# Patient Record
Sex: FEMALE | Race: WHITE | Employment: FULL TIME
[De-identification: names, ages, dates, MRNs, and addresses within clinical notes are randomized per-mention and may not be internally consistent; named-entity substitution may affect disease eponyms.]

---

## 2024-02-02 ENCOUNTER — OFFICE VISIT (OUTPATIENT)
Dept: FAMILY MEDICINE CLINIC | Facility: CLINIC | Age: 24
End: 2024-02-02

## 2024-02-02 VITALS
TEMPERATURE: 97.9 F | HEIGHT: 64 IN | BODY MASS INDEX: 19.81 KG/M2 | SYSTOLIC BLOOD PRESSURE: 102 MMHG | OXYGEN SATURATION: 99 % | WEIGHT: 116 LBS | DIASTOLIC BLOOD PRESSURE: 60 MMHG | HEART RATE: 74 BPM

## 2024-02-02 DIAGNOSIS — M67.432 GANGLION CYST OF DORSUM OF LEFT WRIST: Primary | ICD-10-CM

## 2024-02-02 SDOH — ECONOMIC STABILITY: FOOD INSECURITY: WITHIN THE PAST 12 MONTHS, THE FOOD YOU BOUGHT JUST DIDN'T LAST AND YOU DIDN'T HAVE MONEY TO GET MORE.: NEVER TRUE

## 2024-02-02 SDOH — ECONOMIC STABILITY: FOOD INSECURITY: WITHIN THE PAST 12 MONTHS, YOU WORRIED THAT YOUR FOOD WOULD RUN OUT BEFORE YOU GOT MONEY TO BUY MORE.: NEVER TRUE

## 2024-02-02 SDOH — ECONOMIC STABILITY: INCOME INSECURITY: HOW HARD IS IT FOR YOU TO PAY FOR THE VERY BASICS LIKE FOOD, HOUSING, MEDICAL CARE, AND HEATING?: NOT HARD AT ALL

## 2024-02-02 SDOH — ECONOMIC STABILITY: HOUSING INSECURITY
IN THE LAST 12 MONTHS, WAS THERE A TIME WHEN YOU DID NOT HAVE A STEADY PLACE TO SLEEP OR SLEPT IN A SHELTER (INCLUDING NOW)?: NO

## 2024-02-02 ASSESSMENT — ENCOUNTER SYMPTOMS
CHEST TIGHTNESS: 0
ABDOMINAL PAIN: 0
EYE DISCHARGE: 0
SINUS PAIN: 0
DIARRHEA: 0
SHORTNESS OF BREATH: 0
COUGH: 0
CONSTIPATION: 0

## 2024-02-02 ASSESSMENT — PATIENT HEALTH QUESTIONNAIRE - PHQ9
SUM OF ALL RESPONSES TO PHQ QUESTIONS 1-9: 1
1. LITTLE INTEREST OR PLEASURE IN DOING THINGS: 0
SUM OF ALL RESPONSES TO PHQ9 QUESTIONS 1 & 2: 1
SUM OF ALL RESPONSES TO PHQ QUESTIONS 1-9: 1

## 2024-02-02 NOTE — PROGRESS NOTES
Mary Howe is a 23 y.o. female who presents with   Chief Complaint   Patient presents with    Cyst     On left wrist, worsening - larger and more painful    Skin Problem     Right ring finger, raised area, no pain, sometimes peels       History of Present Illness  Ganglion cyst on dorsum L wrist.  Increasing in size and mildly uncomfortable.  Mood doing well.     Review of Systems  Review of Systems   Constitutional:  Negative for appetite change, fatigue and fever.   HENT:  Negative for congestion, ear pain and sinus pain.    Eyes:  Negative for discharge.   Respiratory:  Negative for cough, chest tightness and shortness of breath.    Cardiovascular:  Negative for chest pain, palpitations and leg swelling.   Gastrointestinal:  Negative for abdominal pain, constipation and diarrhea.   Genitourinary:  Negative for dysuria.   Musculoskeletal:  Negative for joint swelling.   Skin:  Negative for rash.   Neurological:  Negative for headaches.   Hematological:  Negative for adenopathy.   Psychiatric/Behavioral:  Negative for dysphoric mood. The patient is not nervous/anxious.         Medications  Current Outpatient Medications   Medication Sig Dispense Refill    levonorgestrel-ethinyl estradiol (AVIANE;ALESSE;LESSINA) 0.1-20 MG-MCG per tablet Take 1 tablet by mouth daily 3 packet 3    sertraline (ZOLOFT) 25 MG tablet Take 1 tablet by mouth daily 90 tablet 3    ondansetron (ZOFRAN-ODT) 4 MG disintegrating tablet Take 1 tablet by mouth every 8 hours as needed       No current facility-administered medications for this visit.        Past Medical History  Past Medical History:   Diagnosis Date    Anxiety     Kidney stone 11/2015       Surgical History  No past surgical history on file.       Family History  Family History   Problem Relation Age of Onset    Cancer Mother     No Known Problems Father     No Known Problems Sister        Social History  Social History     Socioeconomic History    Marital status:

## 2024-02-12 ENCOUNTER — APPOINTMENT (RX ONLY)
Dept: URBAN - METROPOLITAN AREA CLINIC 330 | Facility: CLINIC | Age: 24
Setting detail: DERMATOLOGY
End: 2024-02-12

## 2024-02-12 DIAGNOSIS — M67.4 GANGLION: ICD-10-CM

## 2024-02-12 DIAGNOSIS — Z80.8 FAMILY HISTORY OF MALIGNANT NEOPLASM OF OTHER ORGANS OR SYSTEMS: ICD-10-CM

## 2024-02-12 DIAGNOSIS — L57.8 OTHER SKIN CHANGES DUE TO CHRONIC EXPOSURE TO NONIONIZING RADIATION: ICD-10-CM

## 2024-02-12 DIAGNOSIS — Z71.89 OTHER SPECIFIED COUNSELING: ICD-10-CM

## 2024-02-12 DIAGNOSIS — D22 MELANOCYTIC NEVI: ICD-10-CM

## 2024-02-12 PROBLEM — D48.5 NEOPLASM OF UNCERTAIN BEHAVIOR OF SKIN: Status: ACTIVE | Noted: 2024-02-12

## 2024-02-12 PROBLEM — D22.5 MELANOCYTIC NEVI OF TRUNK: Status: ACTIVE | Noted: 2024-02-12

## 2024-02-12 PROCEDURE — 99203 OFFICE O/P NEW LOW 30 MIN: CPT

## 2024-02-12 PROCEDURE — ? OTHER

## 2024-02-12 PROCEDURE — ? BODY PHOTOGRAPHY

## 2024-02-12 PROCEDURE — ? TREATMENT REGIMEN

## 2024-02-12 PROCEDURE — ? EDUCATIONAL RESOURCES PROVIDED

## 2024-02-12 PROCEDURE — ? FULL BODY SKIN EXAM

## 2024-02-12 PROCEDURE — ? COUNSELING

## 2024-02-12 ASSESSMENT — LOCATION SIMPLE DESCRIPTION DERM
LOCATION SIMPLE: LEFT HAND
LOCATION SIMPLE: LEFT UPPER BACK
LOCATION SIMPLE: RIGHT LOWER BACK
LOCATION SIMPLE: RIGHT UPPER BACK
LOCATION SIMPLE: UPPER BACK

## 2024-02-12 ASSESSMENT — LOCATION DETAILED DESCRIPTION DERM
LOCATION DETAILED: SUPERIOR THORACIC SPINE
LOCATION DETAILED: LEFT MEDIAL UPPER BACK
LOCATION DETAILED: RIGHT SUPERIOR MEDIAL MIDBACK
LOCATION DETAILED: RIGHT MID-UPPER BACK
LOCATION DETAILED: INFERIOR THORACIC SPINE
LOCATION DETAILED: LEFT RADIAL DORSAL HAND

## 2024-02-12 ASSESSMENT — LOCATION ZONE DERM
LOCATION ZONE: TRUNK
LOCATION ZONE: HAND

## 2024-02-12 NOTE — PROCEDURE: OTHER
Detail Level: Generalized
Render Risk Assessment In Note?: yes
Note Text (......Xxx Chief Complaint.): This diagnosis correlates with the
Other (Free Text): Patient consent was obtained to proceed with the visit and recommended plan of care after discussion of all risks and benefits, including the risks of COVID-19 exposure.
Other (Free Text): Pt states PCP drained the lesion. \\nLesion is typically more noticeable. \\nPt states this is painful.\\nWill refer patient to hand center.
Detail Level: Simple
Other (Free Text): Mother

## 2024-02-12 NOTE — PROCEDURE: BODY PHOTOGRAPHY
Was The Entire Body Photographed (Cannot Bill Unless Entire Body Photographed)?: Please Select Yes or No - If you select Yes you are confirming that you took full body photographs
Reason For Photography: The patient is obtaining whole body photography to observe existing suspicious moles and or monitor for the appearance of any new lesions.
Whole Body Statement: The whole body was photographed today.
Consent was obtained for whole body photography. Patient understands that photograph costs may not be covered by insurance.
Number Of Photographs (Optional- Will Not Render If 0): 10
Detail Level: Detailed

## 2024-05-22 ENCOUNTER — OFFICE VISIT (OUTPATIENT)
Dept: FAMILY MEDICINE CLINIC | Facility: CLINIC | Age: 24
End: 2024-05-22
Payer: COMMERCIAL

## 2024-05-22 VITALS
BODY MASS INDEX: 21.97 KG/M2 | OXYGEN SATURATION: 98 % | SYSTOLIC BLOOD PRESSURE: 116 MMHG | TEMPERATURE: 97.5 F | DIASTOLIC BLOOD PRESSURE: 68 MMHG | HEART RATE: 78 BPM | WEIGHT: 128 LBS

## 2024-05-22 DIAGNOSIS — N92.6 IRREGULAR MENSTRUATION, UNSPECIFIED: ICD-10-CM

## 2024-05-22 DIAGNOSIS — F41.9 ANXIETY: ICD-10-CM

## 2024-05-22 PROCEDURE — 99213 OFFICE O/P EST LOW 20 MIN: CPT | Performed by: FAMILY MEDICINE

## 2024-05-22 RX ORDER — LEVONORGESTREL/ETHIN.ESTRADIOL 0.1-0.02MG
1 TABLET ORAL DAILY
Qty: 3 PACKET | Refills: 3 | Status: SHIPPED | OUTPATIENT
Start: 2024-05-22

## 2024-05-22 RX ORDER — SERTRALINE HYDROCHLORIDE 25 MG/1
25 TABLET, FILM COATED ORAL DAILY
Qty: 90 TABLET | Refills: 3 | Status: SHIPPED | OUTPATIENT
Start: 2024-05-22

## 2024-05-22 ASSESSMENT — ENCOUNTER SYMPTOMS
SINUS PAIN: 0
CONSTIPATION: 0
DIARRHEA: 0
EYE DISCHARGE: 0
COUGH: 0
SHORTNESS OF BREATH: 0
CHEST TIGHTNESS: 0
ABDOMINAL PAIN: 0

## 2024-05-22 NOTE — PROGRESS NOTES
Mary Howe is a 24 y.o. female who presents with   Chief Complaint   Patient presents with    Anxiety       History of Present Illness  Pt taking Zoloft for anxiety.  Mood has been good and anxiety sxs controlled.  Sleeping well.  Appetite good.  Periods doing well on OCP.  Does not plan pregnancy soon.  Pap 5/2023. Ganglion cyst healing well since surgery.     Review of Systems  Review of Systems   Constitutional:  Negative for appetite change, fatigue and fever.   HENT:  Negative for congestion, ear pain and sinus pain.    Eyes:  Negative for discharge.   Respiratory:  Negative for cough, chest tightness and shortness of breath.    Cardiovascular:  Negative for chest pain, palpitations and leg swelling.   Gastrointestinal:  Negative for abdominal pain, constipation and diarrhea.   Genitourinary:  Negative for dysuria.   Musculoskeletal:  Negative for joint swelling.   Skin:  Negative for rash.   Neurological:  Negative for headaches.   Hematological:  Negative for adenopathy.   Psychiatric/Behavioral:  Negative for dysphoric mood. The patient is not nervous/anxious.         Medications  Current Outpatient Medications   Medication Sig Dispense Refill    levonorgestrel-ethinyl estradiol (AVIANE;ALESSE;LESSINA) 0.1-20 MG-MCG per tablet Take 1 tablet by mouth daily 3 packet 3    sertraline (ZOLOFT) 25 MG tablet Take 1 tablet by mouth daily 90 tablet 3    ondansetron (ZOFRAN-ODT) 4 MG disintegrating tablet Take 1 tablet by mouth every 8 hours as needed       No current facility-administered medications for this visit.        Past Medical History  Past Medical History:   Diagnosis Date    Anxiety     Kidney stone 11/2015       Surgical History  Past Surgical History:   Procedure Laterality Date    GANGLION CYST EXCISION Left 04/03/2024    wrist          Family History  Family History   Problem Relation Age of Onset    Cancer Mother     No Known Problems Father     No Known Problems Sister        Social

## 2024-08-16 ENCOUNTER — OFFICE VISIT (OUTPATIENT)
Dept: FAMILY MEDICINE CLINIC | Facility: CLINIC | Age: 24
End: 2024-08-16
Payer: COMMERCIAL

## 2024-08-16 VITALS
WEIGHT: 131 LBS | BODY MASS INDEX: 22.36 KG/M2 | SYSTOLIC BLOOD PRESSURE: 104 MMHG | OXYGEN SATURATION: 99 % | DIASTOLIC BLOOD PRESSURE: 76 MMHG | HEART RATE: 106 BPM | TEMPERATURE: 97.9 F | HEIGHT: 64 IN

## 2024-08-16 DIAGNOSIS — J40 BRONCHITIS: Primary | ICD-10-CM

## 2024-08-16 PROCEDURE — 99213 OFFICE O/P EST LOW 20 MIN: CPT | Performed by: NURSE PRACTITIONER

## 2024-08-16 RX ORDER — CODEINE PHOSPHATE/GUAIFENESIN 10-100MG/5
5 LIQUID (ML) ORAL 2 TIMES DAILY PRN
Qty: 120 ML | Refills: 0 | Status: SHIPPED | OUTPATIENT
Start: 2024-08-16 | End: 2024-08-28

## 2024-08-16 RX ORDER — CEFDINIR 300 MG/1
300 CAPSULE ORAL 2 TIMES DAILY
Qty: 20 CAPSULE | Refills: 0 | Status: SHIPPED | OUTPATIENT
Start: 2024-08-16 | End: 2024-08-26

## 2024-08-16 RX ORDER — ALBUTEROL SULFATE 90 UG/1
2 AEROSOL, METERED RESPIRATORY (INHALATION) 4 TIMES DAILY PRN
Qty: 18 G | Refills: 0 | Status: SHIPPED | OUTPATIENT
Start: 2024-08-16

## 2024-08-16 RX ORDER — METHYLPREDNISOLONE 4 MG/1
TABLET ORAL
Qty: 1 KIT | Refills: 0 | Status: SHIPPED | OUTPATIENT
Start: 2024-08-16 | End: 2024-08-22

## 2024-08-16 ASSESSMENT — ENCOUNTER SYMPTOMS
EYE REDNESS: 0
SORE THROAT: 0
CONSTIPATION: 0
NAUSEA: 0
SINUS PAIN: 0
COUGH: 1
EYE PAIN: 0
VOMITING: 0
SHORTNESS OF BREATH: 0
BACK PAIN: 0
DIARRHEA: 0

## 2024-08-16 NOTE — PROGRESS NOTES
Mary Howe (:  2000) is a 24 y.o. female,Established patient, here for evaluation of the following chief complaint(s):  Cough (Was dx with bronchtitis  at urgent , SOB , bad cough )         Assessment & Plan  Bronchitis   Uncontrolled, changes made today: start Omnicef, medrol dose pack and PRN codeine cough syrup/ventolin HFA as directed.  Discussed medications, side effects and risks versus benefits in detail.  Increase fluids and rest.  Flonase and an allergy pill encouraged for symptom management.  Send My Chart message update Monday.  If not improving, will need CXR.   Precautions given for over the weekend and she voiced understanding.    Orders:    cefdinir (OMNICEF) 300 MG capsule; Take 1 capsule by mouth 2 times daily for 10 days    methylPREDNISolone (MEDROL DOSEPACK) 4 MG tablet; Take by mouth.    albuterol sulfate HFA (VENTOLIN HFA) 108 (90 Base) MCG/ACT inhaler; Inhale 2 puffs into the lungs 4 times daily as needed for Wheezing    guaiFENesin-codeine (TUSSI-ORGANIDIN NR) 100-10 MG/5ML syrup; Take 5 mLs by mouth 2 times daily as needed for Cough for up to 12 days. Max Daily Amount: 10 mLs      Return if symptoms worsen or fail to improve.       Subjective   Cough  Pertinent negatives include no chest pain, chills, ear pain, eye redness, fever, headaches, myalgias, rash, sore throat or shortness of breath. There is no history of environmental allergies.   Diagnosed with bronchitis at urgent care.  Prescribed Azithromycin and four days of Prednisone without relief.  Continues with dry cough.  Sore throat.  SOB after coughing fits.  No wheezing.  No ear or sinus pain.  No nausea or vomiting.  Afebrile.      Review of Systems   Constitutional:  Negative for chills and fever.   HENT:  Negative for congestion, ear pain, sinus pain and sore throat.    Eyes:  Negative for pain and redness.   Respiratory:  Positive for cough. Negative for shortness of breath.    Cardiovascular:  Negative

## 2024-12-26 ENCOUNTER — OFFICE VISIT (OUTPATIENT)
Dept: FAMILY MEDICINE CLINIC | Facility: CLINIC | Age: 24
End: 2024-12-26
Payer: COMMERCIAL

## 2024-12-26 VITALS
OXYGEN SATURATION: 100 % | DIASTOLIC BLOOD PRESSURE: 60 MMHG | WEIGHT: 128 LBS | BODY MASS INDEX: 21.85 KG/M2 | HEIGHT: 64 IN | HEART RATE: 63 BPM | RESPIRATION RATE: 12 BRPM | SYSTOLIC BLOOD PRESSURE: 98 MMHG

## 2024-12-26 DIAGNOSIS — S93.401D SPRAIN OF RIGHT ANKLE, UNSPECIFIED LIGAMENT, SUBSEQUENT ENCOUNTER: Primary | ICD-10-CM

## 2024-12-26 PROCEDURE — 99213 OFFICE O/P EST LOW 20 MIN: CPT | Performed by: FAMILY MEDICINE

## 2024-12-26 RX ORDER — CELECOXIB 200 MG/1
200 CAPSULE ORAL DAILY
Qty: 20 CAPSULE | Refills: 1 | Status: SHIPPED | OUTPATIENT
Start: 2024-12-26

## 2024-12-26 ASSESSMENT — ENCOUNTER SYMPTOMS
ABDOMINAL PAIN: 0
SHORTNESS OF BREATH: 0
EYE DISCHARGE: 0
COUGH: 0
CONSTIPATION: 0
CHEST TIGHTNESS: 0
SINUS PAIN: 0
DIARRHEA: 0

## 2024-12-26 NOTE — PROGRESS NOTES
History:   Diagnosis Date    Anxiety     Kidney stone 11/2015       Surgical History  Past Surgical History:   Procedure Laterality Date    GANGLION CYST EXCISION Left 04/03/2024    wrist          Family History  Family History   Problem Relation Age of Onset    Cancer Mother     No Known Problems Father     No Known Problems Sister        Social History  Social History     Socioeconomic History    Marital status:      Spouse name: Not on file    Number of children: Not on file    Years of education: Not on file    Highest education level: Not on file   Occupational History    Not on file   Tobacco Use    Smoking status: Never    Smokeless tobacco: Never   Vaping Use    Vaping status: Never Used   Substance and Sexual Activity    Alcohol use: Not Currently     Alcohol/week: 2.0 standard drinks of alcohol     Types: 1 Glasses of wine, 1 Drinks containing 0.5 oz of alcohol per week     Comment: occasional    Drug use: No    Sexual activity: Yes     Partners: Male     Comment: Using birth control and contraceptives   Other Topics Concern    Not on file   Social History Narrative    Not on file     Social Determinants of Health     Financial Resource Strain: Low Risk  (2/2/2024)    Overall Financial Resource Strain (CARDIA)     Difficulty of Paying Living Expenses: Not hard at all   Food Insecurity: No Food Insecurity (2/2/2024)    Hunger Vital Sign     Worried About Running Out of Food in the Last Year: Never true     Ran Out of Food in the Last Year: Never true   Transportation Needs: Unknown (2/2/2024)    PRAPARE - Transportation     Lack of Transportation (Medical): Not on file     Lack of Transportation (Non-Medical): No   Physical Activity: Not on file   Stress: Not on file   Social Connections: Unknown (3/19/2021)    Received from [facility]    Social Connections     Frequency of Communication with Friends and Family: Not asked     Frequency of Social Gatherings with Friends and Family:

## 2025-01-08 ENCOUNTER — OFFICE VISIT (OUTPATIENT)
Dept: ORTHOPEDIC SURGERY | Age: 25
End: 2025-01-08
Payer: COMMERCIAL

## 2025-01-08 DIAGNOSIS — M25.571 RIGHT ANKLE PAIN, UNSPECIFIED CHRONICITY: ICD-10-CM

## 2025-01-08 DIAGNOSIS — S93.411A SPRAIN OF CALCANEOFIBULAR LIGAMENT OF RIGHT ANKLE, INITIAL ENCOUNTER: Primary | ICD-10-CM

## 2025-01-08 PROCEDURE — 99204 OFFICE O/P NEW MOD 45 MIN: CPT | Performed by: ORTHOPAEDIC SURGERY

## 2025-01-08 NOTE — PROGRESS NOTES
Name: Mary Howe  YOB: 2000  Gender: female  MRN: [id number]    Summary:   Right lateral ankle sprain with questionable RSD       CC: New Patient (Right ankle pain after injury on 12/17/24. Urgent care told this patient she had a bad sprain but her family doctor is concerned about a torn ligament as well. )       HPI: Mary Howe is a 24 y.o. female who presents with New Patient (Right ankle pain after injury on 12/17/24. Urgent care told this patient she had a bad sprain but her family doctor is concerned about a torn ligament as well. )  .  This patient presents the office today about 3 weeks out from a work-related injury where she stepped on a root of a rock on the walkway at [employer] at work and had an injury to her lateral ankle on the side.  She is tried a walker boot for this without much benefit as well as a lace up ankle brace as well as anti-inflammatories.  She was seen in urgent care and an x-ray performed as well.    History was obtained by Patient and her mom    ROS/Meds/PSH/PMH/FH/SH: I personally reviewed the patients standard intake form.  Below are the pertinents    Tobacco:  reports that she has never smoked. She has never used smokeless tobacco.  Diabetes: None      Physical Examination:    Exam of the right lower extremity shows tender to palpation globally along the lateral ankle ligament complex.  She has good stability talar tilt testing and intra drawer testing.  She has difficulty with range of motion testing secondary to pain.  She does have some hot and cold intolerance and some skin mottling on the side as well as some global hypersensitivity compared to contralateral extremity.    Imaging:   Interpretation of imaging  Right foot and ankle XR: AP, Lateral, Oblique views     ICD-10-CM    1. Right ankle pain, unspecified chronicity  M25.571 XR ANKLE RIGHT (MIN 3 VIEWS)     XR FOOT RIGHT (2 VIEWS)      2. Sprain of calcaneofibular ligament of right

## 2025-01-13 ENCOUNTER — HOSPITAL ENCOUNTER (OUTPATIENT)
Dept: PHYSICAL THERAPY | Age: 25
Setting detail: RECURRING SERIES
Discharge: HOME | End: 2025-01-16
Attending: ORTHOPAEDIC SURGERY
Payer: COMMERCIAL

## 2025-01-13 DIAGNOSIS — R26.81 UNSTEADINESS ON FEET: ICD-10-CM

## 2025-01-13 DIAGNOSIS — M25.571 PAIN IN RIGHT ANKLE AND JOINTS OF RIGHT FOOT: Primary | ICD-10-CM

## 2025-01-13 DIAGNOSIS — M62.838 OTHER MUSCLE SPASM: ICD-10-CM

## 2025-01-13 DIAGNOSIS — M79.604 PAIN IN RIGHT LEG: ICD-10-CM

## 2025-01-13 DIAGNOSIS — R29.898 WEAKNESS OF RIGHT FOOT: ICD-10-CM

## 2025-01-13 PROCEDURE — 97110 THERAPEUTIC EXERCISES: CPT

## 2025-01-13 PROCEDURE — 97162 PT EVAL MOD COMPLEX 30 MIN: CPT

## 2025-01-13 NOTE — PROGRESS NOTES
Future Appointments   Date Time Provider Department Center   1/15/2025  8:00 AM Anuradha Cm, PTA [facility]   1/20/2025  2:45 PM Anuradha Cm, PTA [facility]   1/24/2025  9:30 AM Chase Tesfaye, PT [facility]   1/27/2025 10:15 AM Chase Tesfaye, PT [facility]   1/30/2025  3:30 PM Chase Tesfaye, PT [facility]   2/19/2025 11:00 AM Sebastian Live III, MD [facility]

## 2025-01-13 NOTE — THERAPY EVALUATION
Mary Valera Shyam  : 2000  Primary: [employer] Daley (Worker's Comp)  Secondary:  [facility] [place]  [address] [facility] [address]  Phone: [phone]  Fax: [phone] Plan Frequency: 1-2 sessions per week      Plan of Care/Certification Expiration Date: 25        Plan of Care/Certification Expiration Date:  Plan of Care/Certification Expiration Date: 25    Frequency/Duration: Plan Frequency: 1-2 sessions per week        Time In/Out:   Time In: 1145  Time Out: 1230      PT Visit Info:         Visit Count:  1                OUTPATIENT PHYSICAL THERAPY:             Initial Assessment 2025               Episode (Right ankle Rehabilitation)         Treatment Diagnosis:     Pain in right ankle and joints of right foot  Weakness of right foot  Other muscle spasm  Pain in right leg  Unsteadiness on feet  Medical/Referring Diagnosis:    Right ankle pain, unspecified chronicity  Sprain of calcaneofibular ligament of right ankle, initial encounter      Referring Physician:  Sebastian Live III, MD MD Orders:  PT Eval and Treat   Return MD Appt:  4-6 weeks  Date of Onset:  Onset Date: 24     Allergies:  Patient has no known allergies.  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 She was walking through a grass area on campus and stumbled and injured her right ankle on 2024. She has had continues and persistent 8/10 pain. She notes no bruising or swelling post injury but notes that the pain prevents her from ambulating farther than only short walks. She notes that standing is painful. Sitting helps some but does not reduce her pain immediately. She notes sensitivity of the lateral lower leg and lateral foot to light touch.     2024 - cut through

## 2025-01-15 ENCOUNTER — HOSPITAL ENCOUNTER (OUTPATIENT)
Dept: PHYSICAL THERAPY | Age: 25
Setting detail: RECURRING SERIES
Discharge: HOME | End: 2025-01-18
Attending: ORTHOPAEDIC SURGERY
Payer: COMMERCIAL

## 2025-01-15 PROCEDURE — 97110 THERAPEUTIC EXERCISES: CPT

## 2025-01-15 PROCEDURE — 97140 MANUAL THERAPY 1/> REGIONS: CPT

## 2025-01-15 NOTE — PROGRESS NOTES
to improve mobility, strength, balance and coordination. Required minimal verbal and manual cues to promote proper body alignment, promote proper body posture and promote proper body mechanics. Progressed resistance, range, repetitions and complexity of movement as indicated.  MANUAL THERAPY: (see below for minutes): Joint mobilization and Soft tissue mobilization was utilized and necessary because of the patient's restricted joint motion, painful spasm, loss of articular motion and restricted motion of soft tissue.   MODALITIES: (see below for minutes): to decrease pain   SELF CARE: (see below for minutes): Procedure(s) (per grid) utilized to improve and/or restore self-care/home management as related to dressing, bathing and grooming. Required minimal verbal cueing to facilitate activities of daily living skills and compensatory activities    Date: 1/13/25  Visit 1  evaluation 1/15/25 (visit 2)      Modalities:                                Therapeutic Exercise: 30 min 15 min       Education in progression of care and POC    Education in pain management specifically in regards to desensitization of the nervous structures of the ankle utilizing knee flexion when elevating the foot and warm modalities on the lower leg as long as swelling is controlled    Educated in light Ankle AROM and toe rom HEP Ankle pumps x30    Toe abduction x20    Toe yoga 3x10                                               Proprioceptive Activities:                                Manual Therapy:  20 min        Soft tissue mobilization to lateral R ankle and plantar fascia              Functional Activities:                                            Treatment/Session Summary:    Treatment Assessment:   Pt reported relief after manual therapy but reported mild pain with activity. Pt is guarded with all motions. She is compliant with heat.  Communication/Consultation:  Spoke with patient in regards to PT POC.  Equipment provided today:

## 2025-01-17 ENCOUNTER — APPOINTMENT (OUTPATIENT)
Dept: PHYSICAL THERAPY | Age: 25
End: 2025-01-17
Attending: ORTHOPAEDIC SURGERY
Payer: COMMERCIAL

## 2025-01-20 ENCOUNTER — HOSPITAL ENCOUNTER (OUTPATIENT)
Dept: PHYSICAL THERAPY | Age: 25
Setting detail: RECURRING SERIES
Discharge: HOME | End: 2025-01-23
Attending: ORTHOPAEDIC SURGERY
Payer: COMMERCIAL

## 2025-01-20 PROCEDURE — 97110 THERAPEUTIC EXERCISES: CPT

## 2025-01-20 PROCEDURE — 97140 MANUAL THERAPY 1/> REGIONS: CPT

## 2025-01-20 NOTE — PROGRESS NOTES
per grid below to improve mobility, strength, balance and coordination. Required minimal verbal and manual cues to promote proper body alignment, promote proper body posture and promote proper body mechanics. Progressed resistance, range, repetitions and complexity of movement as indicated.  MANUAL THERAPY: (see below for minutes): Joint mobilization and Soft tissue mobilization was utilized and necessary because of the patient's restricted joint motion, painful spasm, loss of articular motion and restricted motion of soft tissue.   MODALITIES: (see below for minutes): to decrease pain   SELF CARE: (see below for minutes): Procedure(s) (per grid) utilized to improve and/or restore self-care/home management as related to dressing, bathing and grooming. Required minimal verbal cueing to facilitate activities of daily living skills and compensatory activities    Date: 1/13/25  Visit 1  evaluation 1/15/25 (visit 2) 1/20/25 (visit 3)     Modalities:                                Therapeutic Exercise: 30 min 15 min 25 min      Education in progression of care and POC    Education in pain management specifically in regards to desensitization of the nervous structures of the ankle utilizing knee flexion when elevating the foot and warm modalities on the lower leg as long as swelling is controlled    Educated in light Ankle AROM and toe rom HEP Ankle pumps x30    Toe abduction x20    Toe yoga 3x10  Ankle pumps x30    Toe abduction x30 seated    Seated toe yoga x30    Seated knee flexion slides on board x30    Seated heel raises 2x10                                             Proprioceptive Activities:                                Manual Therapy:  20 min 15 min       Soft tissue mobilization to lateral R ankle and plantar fascia STM to lateral ankle and plantar fascia              Functional Activities:                                            Treatment/Session Summary:    Treatment Assessment:   Pt tolerated more

## 2025-01-22 ENCOUNTER — APPOINTMENT (OUTPATIENT)
Dept: PHYSICAL THERAPY | Age: 25
End: 2025-01-22
Attending: ORTHOPAEDIC SURGERY
Payer: COMMERCIAL

## 2025-01-24 ENCOUNTER — HOSPITAL ENCOUNTER (OUTPATIENT)
Dept: PHYSICAL THERAPY | Age: 25
Setting detail: RECURRING SERIES
Discharge: HOME | End: 2025-01-27
Attending: ORTHOPAEDIC SURGERY
Payer: COMMERCIAL

## 2025-01-24 PROCEDURE — 97110 THERAPEUTIC EXERCISES: CPT

## 2025-01-24 PROCEDURE — 97140 MANUAL THERAPY 1/> REGIONS: CPT

## 2025-01-24 NOTE — PROGRESS NOTES
Mary Valera Shyam  : 2000  Primary: Nik Daley (Worker's Comp)  Secondary:  [facility]note 3[facility] @ [place]  [address]  [place] [address]  Phone: [phone]  Fax: [phone] Plan Frequency: 1-2 sessions per week    Plan of Care/Certification Expiration Date: 25        Plan of Care/Certification Expiration Date:  Plan of Care/Certification Expiration Date: 25    Frequency/Duration: Plan Frequency: 1-2 sessions per week      Time In/Out:   Time In: 930  Time Out: 1023      PT Visit Info:         Visit Count:  4    OUTPATIENT PHYSICAL THERAPY:   Treatment Note 2025       Episode  (Right ankle Rehabilitation)               Treatment Diagnosis:    Pain in right ankle and joints of right foot  Weakness of right foot  Other muscle spasm  Pain in right leg  Unsteadiness on feet  Medical/Referring Diagnosis:    Right ankle pain, unspecified chronicity  Sprain of calcaneofibular ligament of right ankle, initial encounter      Referring Physician:  Sebastian Live III, MD MD Orders:  PT Eval and Treat   Return MD Appt:  4-6 weeks per physician determination   Date of Onset:  Onset Date: 24     Allergies:   Patient has no known allergies.  Restrictions/Precautions:   None      Interventions Planned (Treatment may consist of any combination of the following):     See Assessment Note    Subjective Comments:   Patient notes she has been using the brace and notes some improved walking.   Last session went well. The board slides were difficult last session.     Initial Pain Level::    5/10  Post Session Pain Level:      5/10  Medications Last Reviewed: 2025  Updated Objective Findings:    Treatment   TREATMENT:   THERAPEUTIC ACTIVITY: ( see below for minutes): Therapeutic activities per grid below to improve mobility, strength, balance and coordination. Required minimal visual, verbal, manual and tactile cues to improve independence and safety with daily activities

## 2025-01-27 ENCOUNTER — HOSPITAL ENCOUNTER (OUTPATIENT)
Dept: PHYSICAL THERAPY | Age: 25
Setting detail: RECURRING SERIES
Discharge: HOME | End: 2025-01-30
Attending: ORTHOPAEDIC SURGERY
Payer: COMMERCIAL

## 2025-01-27 PROCEDURE — 97110 THERAPEUTIC EXERCISES: CPT

## 2025-01-27 PROCEDURE — 97140 MANUAL THERAPY 1/> REGIONS: CPT

## 2025-01-27 NOTE — PROGRESS NOTES
grid below to improve mobility, strength, balance and coordination. Required minimal verbal and manual cues to promote proper body alignment, promote proper body posture and promote proper body mechanics. Progressed resistance, range, repetitions and complexity of movement as indicated.  MANUAL THERAPY: (see below for minutes): Joint mobilization and Soft tissue mobilization was utilized and necessary because of the patient's restricted joint motion, painful spasm, loss of articular motion and restricted motion of soft tissue.   MODALITIES: (see below for minutes): to decrease pain   SELF CARE: (see below for minutes): Procedure(s) (per grid) utilized to improve and/or restore self-care/home management as related to dressing, bathing and grooming. Required minimal verbal cueing to facilitate activities of daily living skills and compensatory activities    Date: 1/13/25  Visit 1  evaluation 1/15/25 (visit 2) 1/20/25 (visit 3) 1/24/25  Visit 4 1/27/25  Visit 5   Modalities:    8 min 8 min       Heat x 8 min Heat x 8 min                      Therapeutic Exercise: 30 min 15 min 25 min 30 min 35 min    Education in progression of care and POC    Education in pain management specifically in regards to desensitization of the nervous structures of the ankle utilizing knee flexion when elevating the foot and warm modalities on the lower leg as long as swelling is controlled    Educated in light Ankle AROM and toe rom HEP Ankle pumps x30    Toe abduction x20    Toe yoga 3x10  Ankle pumps x30    Toe abduction x30 seated    Seated toe yoga x30    Seated knee flexion slides on board x30    Seated heel raises 2x10 Seated toe yoga x 30    Seated heel raise 2 x 10    Seated toe abduction x 30    Seated toe extension x 30    Supine SKTC x 30      Taping posterior glide to distal fibula and posterior tibialis medial arch support**       Seated toe yoga x 30    Seated heel raises x 20    Seated toe extension x 30    Supine DKTC x

## 2025-01-30 ENCOUNTER — HOSPITAL ENCOUNTER (OUTPATIENT)
Dept: PHYSICAL THERAPY | Age: 25
Setting detail: RECURRING SERIES
End: 2025-01-30
Attending: ORTHOPAEDIC SURGERY
Payer: COMMERCIAL

## 2025-01-30 PROCEDURE — 97110 THERAPEUTIC EXERCISES: CPT

## 2025-01-30 NOTE — PROGRESS NOTES
Mary Howe  : 2000  Primary: Nik Daley (Worker's Comp)  Secondary:  [facility] [place]  [address]  [place] [address]  Phone: [phone]  Fax: [phone] Plan Frequency: 1-2 sessions per week    Plan of Care/Certification Expiration Date: 25        Plan of Care/Certification Expiration Date:  Plan of Care/Certification Expiration Date: 25    Frequency/Duration: Plan Frequency: 1-2 sessions per week      Time In/Out:   Time In: 330  Time Out: 417      PT Visit Info:         Visit Count:  6    OUTPATIENT PHYSICAL THERAPY:   Treatment Note 2025       Episode  (Right ankle Rehabilitation)               Treatment Diagnosis:    Pain in right ankle and joints of right foot  Weakness of right foot  Other muscle spasm  Pain in right leg  Unsteadiness on feet  Medical/Referring Diagnosis:    Right ankle pain, unspecified chronicity  Sprain of calcaneofibular ligament of right ankle, initial encounter      Referring Physician:  Sebastian Live III, MD MD Orders:  PT Eval and Treat   Return MD Appt:  4-6 weeks per physician determination   Date of Onset:  Onset Date: 24     Allergies:   Patient has no known allergies.  Restrictions/Precautions:   None      Interventions Planned (Treatment may consist of any combination of the following):     See Assessment Note    Subjective Comments:   Patient notes she was sore the day of last session but feels better today than she has ever since injury.     Initial Pain Level::    5/10  Post Session Pain Level:      5/10  Medications Last Reviewed: 2025  Updated Objective Findings:    Treatment   TREATMENT:   THERAPEUTIC ACTIVITY: ( see below for minutes): Therapeutic activities per grid below to improve mobility, strength, balance and coordination. Required minimal visual, verbal, manual and tactile cues to improve independence and safety with daily activities .  THERAPEUTIC EXERCISE: (see below for

## 2025-01-31 ENCOUNTER — APPOINTMENT (OUTPATIENT)
Dept: PHYSICAL THERAPY | Age: 25
End: 2025-01-31
Attending: ORTHOPAEDIC SURGERY
Payer: COMMERCIAL

## 2025-02-04 ENCOUNTER — HOSPITAL ENCOUNTER (OUTPATIENT)
Dept: PHYSICAL THERAPY | Age: 25
Setting detail: RECURRING SERIES
Discharge: HOME | End: 2025-02-07
Attending: ORTHOPAEDIC SURGERY
Payer: COMMERCIAL

## 2025-02-04 PROCEDURE — 97110 THERAPEUTIC EXERCISES: CPT

## 2025-02-04 NOTE — PROGRESS NOTES
Mary Valera Shyam  : 2000  Primary: Nik Daley (Worker's Comp)  Secondary:  [facility] [place]  [address]  [place] [address]  Phone: [phone]  Fax: [phone] Plan Frequency: 1-2 sessions per week    Plan of Care/Certification Expiration Date: 25        Plan of Care/Certification Expiration Date:  Plan of Care/Certification Expiration Date: 25    Frequency/Duration: Plan Frequency: 1-2 sessions per week      Time In/Out:   Time In: 330  Time Out: 415      PT Visit Info:         Visit Count:  7    OUTPATIENT PHYSICAL THERAPY:   Treatment Note 2025       Episode  (Right ankle Rehabilitation)               Treatment Diagnosis:    Pain in right ankle and joints of right foot  Weakness of right foot  Other muscle spasm  Pain in right leg  Unsteadiness on feet  Medical/Referring Diagnosis:    Right ankle pain, unspecified chronicity  Sprain of calcaneofibular ligament of right ankle, initial encounter      Referring Physician:  Sebastian Live III, MD MD Orders:  PT Eval and Treat   Return MD Appt:  4-6 weeks per physician determination   Date of Onset:  Onset Date: 24     Allergies:   Patient has no known allergies.  Restrictions/Precautions:   None      Interventions Planned (Treatment may consist of any combination of the following):     See Assessment Note    Subjective Comments:   Patient notes that the foot is feeling a lot better today. Walking almost normally today. Pt notes the foot felt great post last session.     Initial Pain Level::    2.5/10  Post Session Pain Level:      2.5/10  Medications Last Reviewed: 2025  Updated Objective Findings:    Treatment   TREATMENT:   THERAPEUTIC ACTIVITY: ( see below for minutes): Therapeutic activities per grid below to improve mobility, strength, balance and coordination. Required minimal visual, verbal, manual and tactile cues to improve independence and safety with daily activities

## 2025-02-05 DIAGNOSIS — N92.6 IRREGULAR MENSTRUATION, UNSPECIFIED: ICD-10-CM

## 2025-02-05 RX ORDER — LEVONORGESTREL/ETHIN.ESTRADIOL 0.1-0.02MG
1 TABLET ORAL DAILY
Qty: 3 PACKET | Refills: 3 | Status: SHIPPED | OUTPATIENT
Start: 2025-02-05

## 2025-02-07 ENCOUNTER — HOSPITAL ENCOUNTER (OUTPATIENT)
Dept: PHYSICAL THERAPY | Age: 25
Setting detail: RECURRING SERIES
Discharge: HOME | End: 2025-02-10
Attending: ORTHOPAEDIC SURGERY
Payer: COMMERCIAL

## 2025-02-07 PROCEDURE — 97110 THERAPEUTIC EXERCISES: CPT

## 2025-02-07 NOTE — PROGRESS NOTES
grid below to improve mobility, strength, balance and coordination. Required minimal verbal and manual cues to promote proper body alignment, promote proper body posture and promote proper body mechanics. Progressed resistance, range, repetitions and complexity of movement as indicated.  MANUAL THERAPY: (see below for minutes): Joint mobilization and Soft tissue mobilization was utilized and necessary because of the patient's restricted joint motion, painful spasm, loss of articular motion and restricted motion of soft tissue.   MODALITIES: (see below for minutes): to decrease pain   SELF CARE: (see below for minutes): Procedure(s) (per grid) utilized to improve and/or restore self-care/home management as related to dressing, bathing and grooming. Required minimal verbal cueing to facilitate activities of daily living skills and compensatory activities    Date: 1/13/25  Visit 1  evaluation 1/15/25 (visit 2) 1/20/25 (visit 3) 1/24/25  Visit 4 1/27/25  Visit 5 1/30/25  Visit 6 2/4/25  Visit 7 2/10/25  Visit 8   Modalities:    8 min 8 min 8 min 8 min 8 min       Heat x 8 min Heat x 8 min    Heat x 8 min Heat x 8 min    Heat x 8 min                            Therapeutic Exercise: 30 min 15 min 25 min 30 min 35 min 39 min 45 min 45 min    Education in progression of care and POC    Education in pain management specifically in regards to desensitization of the nervous structures of the ankle utilizing knee flexion when elevating the foot and warm modalities on the lower leg as long as swelling is controlled    Educated in light Ankle AROM and toe rom HEP Ankle pumps x30    Toe abduction x20    Toe yoga 3x10  Ankle pumps x30    Toe abduction x30 seated    Seated toe yoga x30    Seated knee flexion slides on board x30    Seated heel raises 2x10 Seated toe yoga x 30    Seated heel raise 2 x 10    Seated toe abduction x 30    Seated toe extension x 30    Supine SKTC x 30      Taping posterior glide to distal fibula and

## 2025-02-10 ENCOUNTER — APPOINTMENT (OUTPATIENT)
Dept: PHYSICAL THERAPY | Age: 25
End: 2025-02-10
Attending: ORTHOPAEDIC SURGERY
Payer: COMMERCIAL

## 2025-02-11 ENCOUNTER — APPOINTMENT (OUTPATIENT)
Dept: PHYSICAL THERAPY | Age: 25
End: 2025-02-11
Attending: ORTHOPAEDIC SURGERY
Payer: COMMERCIAL

## 2025-02-12 ENCOUNTER — HOSPITAL ENCOUNTER (OUTPATIENT)
Dept: PHYSICAL THERAPY | Age: 25
Setting detail: RECURRING SERIES
Discharge: HOME | End: 2025-02-15
Attending: ORTHOPAEDIC SURGERY
Payer: COMMERCIAL

## 2025-02-12 PROCEDURE — 97110 THERAPEUTIC EXERCISES: CPT

## 2025-02-12 NOTE — PROGRESS NOTES
Mary Howe  : 2000  Primary: Nik Daley (Worker's Comp)  Secondary:  [facility] [place]  [address]  [place] [address]  Phone: [phone]  Fax: [phone] Plan Frequency: 1-2 sessions per week    Plan of Care/Certification Expiration Date: 25        Plan of Care/Certification Expiration Date:  Plan of Care/Certification Expiration Date: 25    Frequency/Duration: Plan Frequency: 1-2 sessions per week      Time In/Out:   Time In: 0245  Time Out: 338      PT Visit Info:         Visit Count:  9    OUTPATIENT PHYSICAL THERAPY:   Treatment Note 2025       Episode  (Right ankle Rehabilitation)               Treatment Diagnosis:    Pain in right ankle and joints of right foot  Weakness of right foot  Other muscle spasm  Pain in right leg  Unsteadiness on feet  Medical/Referring Diagnosis:    Right ankle pain, unspecified chronicity  Sprain of calcaneofibular ligament of right ankle, initial encounter      Referring Physician:  Sebastian Live III, MD MD Orders:  PT Eval and Treat   Return MD Appt:  4-6 weeks per physician determination   Date of Onset:  Onset Date: 24     Allergies:   Patient has no known allergies.  Restrictions/Precautions:   None      Interventions Planned (Treatment may consist of any combination of the following):     See Assessment Note    Subjective Comments:   Pt notes increased pain today. She began having pain down her entire leg to her lateral ankle. She notes no RACHAEL.     Initial Pain Level::    7/10  Post Session Pain Level:      7/10  Medications Last Reviewed: 2025  Updated Objective Findings:    Treatment   TREATMENT:   THERAPEUTIC ACTIVITY: ( see below for minutes): Therapeutic activities per grid below to improve mobility, strength, balance and coordination. Required minimal visual, verbal, manual and tactile cues to improve independence and safety with daily activities .  THERAPEUTIC EXERCISE: (see below for

## 2025-02-13 ENCOUNTER — HOSPITAL ENCOUNTER (OUTPATIENT)
Dept: PHYSICAL THERAPY | Age: 25
Setting detail: RECURRING SERIES
Discharge: HOME | End: 2025-02-16
Attending: ORTHOPAEDIC SURGERY
Payer: COMMERCIAL

## 2025-02-13 PROCEDURE — 97110 THERAPEUTIC EXERCISES: CPT

## 2025-02-13 NOTE — PROGRESS NOTES
x 30 mike    Prone hip extension x 30 mike x 3#    STS 3 x 10    Standing weight shifts with short foot x 30    Seated toe yoga x 1 min    Seated toe extension x 1 min    Supine DKTC x 30 with ball    LTR with ball x 30    Seated right leg LAQ x 30    Education in management of pain and progression of care    Seated toe yoga x 1 min    Seated toe extension x 1 min    Seated DF x 1 min    Seated heel raises x 1 min    Seated henry disc PF/DF and inver    Posterior leg slider only sliding with affected x 30    Standing resisted hip abduction x 2 x 10 x GTB    Standing resisted hip extension x 2 x 10 x GTB    Standing TKE x 30 x GTB                                                                    Proprioceptive Activities:                                                    Manual Therapy:  20 min 15 min 15 min 10 min          Soft tissue mobilization to lateral R ankle and plantar fascia STM to lateral ankle and plantar fascia  STM foot intrinsics and gastroc Very light STM foot intrinsics and lateral gastroc and Tib anterior                     Functional Activities:                                                                     Treatment/Session Summary:    Treatment Assessment:   Patient showing significant improvement in pain today. Progressing to further weight bearing exercises.     Communication/Consultation:  Spoke with patient in regards to PT POC.  Equipment provided today:  HEP  Recommendations/Intent for next treatment session: Next visit will focus on pain reduction, ankle mobility, pain management.    >Total Treatment Billable Duration:  53 minutes  Time In: 0200  Time Out: 0253    GENESIS TESFAYE PT         Charge Capture  Events  [facility] [place] Portal  Appt Desk  Attendance Report     Future Appointments   Date Time Provider Department Center   2/17/2025  4:15 PM Genesis Tesfaye, PT [facility]   2/18/2025  2:00 PM Genesis Tesfaye, PT [facility]   2/19/2025 11:00 AM Sebastian Live III, MD [facility]

## 2025-02-17 ENCOUNTER — HOSPITAL ENCOUNTER (OUTPATIENT)
Dept: PHYSICAL THERAPY | Age: 25
Setting detail: RECURRING SERIES
Discharge: HOME | End: 2025-02-20
Attending: ORTHOPAEDIC SURGERY
Payer: COMMERCIAL

## 2025-02-17 PROCEDURE — 97110 THERAPEUTIC EXERCISES: CPT

## 2025-02-17 NOTE — PROGRESS NOTES
Mary Valera Shyam  : 2000  Primary: Nik Daley (Worker's Comp)  Secondary:  [facility] [place]  [place] [address]  Phone: [phone]  Fax: [phone] Plan Frequency: 1-2 sessions per week    Plan of Care/Certification Expiration Date: 25        Plan of Care/Certification Expiration Date:  Plan of Care/Certification Expiration Date: 25    Frequency/Duration: Plan Frequency: 1-2 sessions per week      Time In/Out:   Time In: 0420  Time Out: 0500      PT Visit Info:         Visit Count:  11    OUTPATIENT PHYSICAL THERAPY:   Treatment Note 2025       Episode  (Right ankle Rehabilitation)               Treatment Diagnosis:    Pain in right ankle and joints of right foot  Weakness of right foot  Other muscle spasm  Pain in right leg  Unsteadiness on feet  Medical/Referring Diagnosis:    Right ankle pain, unspecified chronicity  Sprain of calcaneofibular ligament of right ankle, initial encounter      Referring Physician:  Sebastian Live III, MD MD Orders:  PT Eval and Treat   Return MD Appt:  4-6 weeks per physician determination   Date of Onset:  Onset Date: 24     Allergies:   Patient has no known allergies.  Restrictions/Precautions:   None      Interventions Planned (Treatment may consist of any combination of the following):     See Assessment Note    Subjective Comments:   Patient notes sore over the weekend. She notes that her foot is feeling somewhat better today but she continues to have \"nerve like\" pain in the lateral foot. Patient notes that her right knee continues to bother her some.     Initial Pain Level::    3/10  Post Session Pain Level:      3/10  Medications Last Reviewed: 2025  Updated Objective Findings:      25 Progress Note:  Pain level 3/10  Ankle DF knee bent: 10 deg  Ankle PF knee bent: 90 deg + 30 deg    Goals: (Goals have been discussed and agreed upon with patient.)  Short-Term Functional Goals: Time

## 2025-02-18 ENCOUNTER — HOSPITAL ENCOUNTER (OUTPATIENT)
Dept: PHYSICAL THERAPY | Age: 25
Setting detail: RECURRING SERIES
Discharge: HOME | End: 2025-02-21
Attending: ORTHOPAEDIC SURGERY
Payer: COMMERCIAL

## 2025-02-18 PROCEDURE — 97110 THERAPEUTIC EXERCISES: CPT

## 2025-02-18 PROCEDURE — 97140 MANUAL THERAPY 1/> REGIONS: CPT

## 2025-02-18 NOTE — PROGRESS NOTES
Mary Valera Shyam  : 2000  Primary: Nik Daley (Worker's Comp)  Secondary:  [facility] [place]  [place] [address]  Phone: [phone]  Fax: [phone] Plan Frequency: 1-2 sessions per week    Plan of Care/Certification Expiration Date: 25        Plan of Care/Certification Expiration Date:  Plan of Care/Certification Expiration Date: 25    Frequency/Duration: Plan Frequency: 1-2 sessions per week      Time In/Out:   Time In: 0200  Time Out: 0253      PT Visit Info:         Visit Count:  12    OUTPATIENT PHYSICAL THERAPY:   Treatment Note 2025       Episode  (Right ankle Rehabilitation)               Treatment Diagnosis:    Pain in right ankle and joints of right foot  Weakness of right foot  Other muscle spasm  Pain in right leg  Unsteadiness on feet  Medical/Referring Diagnosis:    Right ankle pain, unspecified chronicity  Sprain of calcaneofibular ligament of right ankle, initial encounter      Referring Physician:  Sebastian Live III, MD MD Orders:  PT Eval and Treat   Return MD Appt:  4-6 weeks per physician determination   Date of Onset:  Onset Date: 24     Allergies:   Patient has no known allergies.  Restrictions/Precautions:   None      Interventions Planned (Treatment may consist of any combination of the following):     See Assessment Note    Subjective Comments:   Se-recert note    Initial Pain Level::    3/10  Post Session Pain Level:      3/10  Medications Last Reviewed: 2025  Updated Objective Findings:      25 Progress Note:  Pain level 3/10  Ankle DF knee bent: 10 deg  Ankle PF knee bent: 90 deg + 30 deg      Treatment   TREATMENT:   THERAPEUTIC ACTIVITY: ( see below for minutes): Therapeutic activities per grid below to improve mobility, strength, balance and coordination. Required minimal visual, verbal, manual and tactile cues to improve independence and safety with daily activities .  THERAPEUTIC EXERCISE:

## 2025-02-18 NOTE — THERAPY RECERTIFICATION
Mary Valera Shyam  : 2000  Primary: Nik Daley (Worker's Comp)  Secondary:  [facility] [place]  [address] [facility] [address]  Phone: [phone]  Fax: [phone] Plan Frequency: 1-2 sessions per week      Plan of Care/Certification Expiration Date: 25        Plan of Care/Certification Expiration Date:  Plan of Care/Certification Expiration Date: 25    Frequency/Duration: Plan Frequency: 1-2 sessions per week        Time In/Out:          PT Visit Info:         Visit Count:  12                OUTPATIENT PHYSICAL THERAPY:             Therapy Re-assessment and Re-certification 2025               Episode (Right ankle Rehabilitation)         Treatment Diagnosis:     Pain in right ankle and joints of right foot  Weakness of right foot  Other muscle spasm  Pain in right leg  Unsteadiness on feet  Medical/Referring Diagnosis:    Right ankle pain, unspecified chronicity  Sprain of calcaneofibular ligament of right ankle, initial encounter      Referring Physician:  Sebastian Live III, MD MD Orders:  PT Eval and Treat   Return MD Appt:  4-6 weeks  Date of Onset:  Onset Date: 24     Allergies:  Patient has no known allergies.  Restrictions/Precautions:    None      Medications Last Reviewed: 2025     SUBJECTIVE   History of Injury/Illness (Reason for Referral):  Patient is a 24 yr old female who works at [employer] with the on campus [employer]. She was walking through a grass area on campus and stumbled and injured her right ankle on 2024. She has had continues and persistent 8/10 pain. She notes no bruising or swelling post injury but notes that the pain prevents her from ambulating farther than only short walks. She notes that standing is painful. Sitting helps some but does not reduce her pain immediately. She notes sensitivity of the lateral lower leg and lateral foot to light touch.     2024 - cut through grass

## 2025-02-19 ENCOUNTER — OFFICE VISIT (OUTPATIENT)
Dept: ORTHOPEDIC SURGERY | Age: 25
End: 2025-02-19

## 2025-02-19 DIAGNOSIS — S93.411A SPRAIN OF CALCANEOFIBULAR LIGAMENT OF RIGHT ANKLE, INITIAL ENCOUNTER: Primary | ICD-10-CM

## 2025-02-19 NOTE — PROGRESS NOTES
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 female who presents with Follow-up (Right lateral ankle sprain with questionable RSD)  .  This patient presents to the office today with some improvements although slowly with her right ankle pain and sensitivity.  She is been in supervised physical therapy for desensitization.    History was obtained by Patient     ROS/Meds/PSH/PMH/FH/SH: I personally reviewed the patients standard intake form.  Below are the pertinents    Tobacco:  reports that she has never smoked. She has never used smokeless tobacco.  Diabetes: None      Physical Examination:    Exam of the right lower extremity shows good stability talar tilt testing and/or direct plantar drawer test.  She does have some hypersensitivity over the lateral ankle with some skin mottling and changes.  She has excellent range of motion of the tibiotalar joint now.    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  She is making some progress forward although slowly.  I recommend continued supervised physical therapy.  We did discuss the potential referral for interventional pain management in the future if she does not continue to improve.

## 2025-02-25 ENCOUNTER — HOSPITAL ENCOUNTER (OUTPATIENT)
Dept: PHYSICAL THERAPY | Age: 25
Setting detail: RECURRING SERIES
Discharge: HOME | End: 2025-02-28
Attending: ORTHOPAEDIC SURGERY
Payer: COMMERCIAL

## 2025-02-25 PROCEDURE — 97110 THERAPEUTIC EXERCISES: CPT

## 2025-02-25 NOTE — PROGRESS NOTES
Mary Valera Shyam  : 2000  Primary: Nik Daley (Worker's Comp)  Secondary:  [facility] [place]  [address]  [place] [address]  Phone: [phone]  Fax: [phone] Plan Frequency: 1-2 sessions per week    Plan of Care/Certification Expiration Date: 25        Plan of Care/Certification Expiration Date:  Plan of Care/Certification Expiration Date: 25    Frequency/Duration: Plan Frequency: 1-2 sessions per week      Time In/Out:   Time In: 1240  Time Out: 1318      PT Visit Info:         Visit Count:  13    OUTPATIENT PHYSICAL THERAPY:   Treatment Note 2025       Episode  (Right ankle Rehabilitation)               Treatment Diagnosis:    Pain in right ankle and joints of right foot  Weakness of right foot  Other muscle spasm  Pain in right leg  Unsteadiness on feet  Medical/Referring Diagnosis:    Right ankle pain, unspecified chronicity  Sprain of calcaneofibular ligament of right ankle, initial encounter      Referring Physician:  Sebastian Live III, MD MD Orders:  PT Eval and Treat   Return MD Appt:  4-6 weeks per physician determination   Date of Onset:  Onset Date: 24     Allergies:   Patient has no known allergies.  Restrictions/Precautions:   None      Interventions Planned (Treatment may consist of any combination of the following):     See Assessment Note    Subjective Comments:   Notes appointment with doctor went really well.     Initial Pain Level::    3/10  Post Session Pain Level:      3/10  Medications Last Reviewed: 2025  Updated Objective Findings:      25 Progress Note:  Pain level 3/10  Ankle DF knee bent: 10 deg  Ankle PF knee bent: 90 deg + 30 deg      Treatment   TREATMENT:   THERAPEUTIC ACTIVITY: ( see below for minutes): Therapeutic activities per grid below to improve mobility, strength, balance and coordination. Required minimal visual, verbal, manual and tactile cues to improve independence and safety with daily

## 2025-02-26 ENCOUNTER — APPOINTMENT (OUTPATIENT)
Dept: PHYSICAL THERAPY | Age: 25
End: 2025-02-26
Attending: ORTHOPAEDIC SURGERY
Payer: COMMERCIAL

## 2025-02-28 ENCOUNTER — HOSPITAL ENCOUNTER (OUTPATIENT)
Dept: PHYSICAL THERAPY | Age: 25
Setting detail: RECURRING SERIES
End: 2025-02-28
Attending: ORTHOPAEDIC SURGERY
Payer: COMMERCIAL

## 2025-02-28 ENCOUNTER — APPOINTMENT (OUTPATIENT)
Dept: PHYSICAL THERAPY | Age: 25
End: 2025-02-28
Attending: ORTHOPAEDIC SURGERY
Payer: COMMERCIAL

## 2025-02-28 PROCEDURE — 97110 THERAPEUTIC EXERCISES: CPT

## 2025-02-28 NOTE — PROGRESS NOTES
Mary Valera Shyam  : 2000  Primary: Nik Daley (Worker's Comp)  Secondary:  [facility] [place]  [place] [address]  Phone: [phone]  Fax: [phone] Plan Frequency: 1-2 sessions per week    Plan of Care/Certification Expiration Date: 25        Plan of Care/Certification Expiration Date:  Plan of Care/Certification Expiration Date: 25    Frequency/Duration: Plan Frequency: 1-2 sessions per week      Time In/Out:   Time In: 1230  Time Out: 1316      PT Visit Info:         Visit Count:  14    OUTPATIENT PHYSICAL THERAPY:   Treatment Note 2025       Episode  (Right ankle Rehabilitation)               Treatment Diagnosis:    Pain in right ankle and joints of right foot  Weakness of right foot  Other muscle spasm  Pain in right leg  Unsteadiness on feet  Medical/Referring Diagnosis:    Right ankle pain, unspecified chronicity  Sprain of calcaneofibular ligament of right ankle, initial encounter      Referring Physician:  Sebastain Live III, MD MD Orders:  PT Eval and Treat   Return MD Appt:  4-6 weeks per physician determination   Date of Onset:  Onset Date: 24     Allergies:   Patient has no known allergies.  Restrictions/Precautions:   None      Interventions Planned (Treatment may consist of any combination of the following):     See Assessment Note    Subjective Comments:   Patient notes that she was not too sore post last session.     Initial Pain Level::    3/10  Post Session Pain Level:      3/10  Medications Last Reviewed: 2025  Updated Objective Findings:      25 Progress Note:  Pain level 3/10  Ankle DF knee bent: 10 deg  Ankle PF knee bent: 90 deg + 30 deg      Treatment   TREATMENT:   THERAPEUTIC ACTIVITY: ( see below for minutes): Therapeutic activities per grid below to improve mobility, strength, balance and coordination. Required minimal visual, verbal, manual and tactile cues to improve independence and safety  min    Seated heel raises x 1 min    Seated henry disc PF/DF and inver    Posterior leg slider only sliding with affected x 30    Standing resisted hip abduction x 2 x 10 x GTB    Standing resisted hip extension x 2 x 10 x GTB    Standing TKE x 30 x GTB Seated toe yoga x 1 min    Seated toe extension x 1 min    Seated DF x 1 min    Seated short foot x 1 min    Seated heel raises x 1 min    Seated henry disc PF/DF and inver    SLR x 30    Sidelying hip abduction x 30    Bridges x 30 with cues for neutral spine      Cat camel x 10    Open book x 10    Piriformis stretch x 3 x 15 sec    Fig 4 stretch 3 x 15 sec    PPT x 2 min    TrA in hooklying with bolster x 2 min    Supine hooklying parlav press x 30 each side x YTB    Education in HEP and management of pain DKTC x 2 min    Seated toe yoga x 1 min    Seated toe extension x 1 min    Seated DF x 1 min    Seated heel raises x 1 min    SLR x 30    Sidelying hip abduction x 30 DKTC x 2 min    Seated toe yoga x 1 min    Seated toe extension x 1 min    Seated DF x 1 min    Seated heel raises x 1 min    SLR x 30 x 2#    Sidelying hip abduction x 3 x 10 x 2#    Standing heel raises x 30    DL squats x 30    Standing marches x 30 with affected leg standing with cues for short foot control                                                                                               Proprioceptive Activities:                                                                    Manual Therapy:  20 min 15 min 15 min 10 min       10 min       Soft tissue mobilization to lateral R ankle and plantar fascia STM to lateral ankle and plantar fascia  STM foot intrinsics and gastroc Very light STM foot intrinsics and lateral gastroc and Tib anterior       STM right glut med, min, vastus lateralis    Right lumbar distraction via long axis distraction grade 3                      Functional Activities:

## 2025-03-04 ENCOUNTER — HOSPITAL ENCOUNTER (OUTPATIENT)
Dept: PHYSICAL THERAPY | Age: 25
Setting detail: RECURRING SERIES
Discharge: HOME | End: 2025-03-07
Attending: ORTHOPAEDIC SURGERY
Payer: COMMERCIAL

## 2025-03-04 PROCEDURE — 97110 THERAPEUTIC EXERCISES: CPT

## 2025-03-04 NOTE — PROGRESS NOTES
min        Soft tissue mobilization to lateral R ankle and plantar fascia STM to lateral ankle and plantar fascia  STM foot intrinsics and gastroc Very light STM foot intrinsics and lateral gastroc and Tib anterior       STM right glut med, min, vastus lateralis    Right lumbar distraction via long axis distraction grade 3                        Functional Activities:                                                                                              Treatment/Session Summary:    Treatment Assessment:   Patient showing improved tolerance to WB functional activities. Tolerated greater intensity of proprioceptive training today.     Communication/Consultation:  Spoke with patient in regards to PT POC.  Equipment provided today:  HEP  Recommendations/Intent for next treatment session: Next visit will focus on pain reduction, ankle mobility, pain management.    >Total Treatment Billable Duration:  47 minutes  Time In: 1230  Time Out: 1317    CHASE TESFAYE PT         Charge Capture  Events  [employer] Portal  Appt Desk  Attendance Report     Future Appointments   Date Time Provider Department Center   3/7/2025  8:45 AM Chase Tesfaye, PT [facility]   3/11/2025 12:30 PM Chase Tesfaye, PT [facility]   3/13/2025 12:30 PM Chase Tesfaye, PT [facility]   3/18/2025  2:45 PM Chase Tesfaye, PT [facility]   3/20/2025  2:00 PM Chase Tesfaye, PT [facility]   3/25/2025 12:30 PM Chase Tesfaye, PT [facility]   3/27/2025 12:30 PM Chase Tesfaye, PT [facility]   4/23/2025 11:00 AM Sebastian Live III, MD [facility]

## 2025-03-06 ENCOUNTER — APPOINTMENT (OUTPATIENT)
Dept: PHYSICAL THERAPY | Age: 25
End: 2025-03-06
Attending: ORTHOPAEDIC SURGERY
Payer: COMMERCIAL

## 2025-03-07 ENCOUNTER — APPOINTMENT (OUTPATIENT)
Dept: PHYSICAL THERAPY | Age: 25
End: 2025-03-07
Attending: ORTHOPAEDIC SURGERY
Payer: COMMERCIAL

## 2025-03-11 ENCOUNTER — HOSPITAL ENCOUNTER (OUTPATIENT)
Dept: PHYSICAL THERAPY | Age: 25
Setting detail: RECURRING SERIES
Discharge: HOME | End: 2025-03-14
Attending: ORTHOPAEDIC SURGERY
Payer: COMMERCIAL

## 2025-03-11 PROCEDURE — 97110 THERAPEUTIC EXERCISES: CPT

## 2025-03-11 NOTE — PROGRESS NOTES
Mary Valera Shyam  : 2000  Primary: Nik Daley (Worker's Comp)  Secondary:  [facility] @ [place]  [place] [address]  Phone: [phone]  Fax: [phone] Plan Frequency: 1-2 sessions per week    Plan of Care/Certification Expiration Date: 25        Plan of Care/Certification Expiration Date:  Plan of Care/Certification Expiration Date: 25    Frequency/Duration: Plan Frequency: 1-2 sessions per week      Time In/Out:   Time In: 1230  Time Out: 1318      PT Visit Info:         Visit Count:  16    OUTPATIENT PHYSICAL THERAPY:   Treatment Note 3/11/2025       Episode  (Right ankle Rehabilitation)               Treatment Diagnosis:    Pain in right ankle and joints of right foot  Weakness of right foot  Other muscle spasm  Pain in right leg  Unsteadiness on feet  Medical/Referring Diagnosis:    Right ankle pain, unspecified chronicity  Sprain of calcaneofibular ligament of right ankle, initial encounter      Referring Physician:  Sebastian Live III, MD MD Orders:  PT Eval and Treat   Return MD Appt:  4-6 weeks per physician determination   Date of Onset:  Onset Date: 24     Allergies:   Patient has no known allergies.  Restrictions/Precautions:   None      Interventions Planned (Treatment may consist of any combination of the following):     See Assessment Note    Subjective Comments:   Woke up with some soreness this morning.     Initial Pain Level::    3/10  Post Session Pain Level:      3/10  Medications Last Reviewed: 3/11/2025  Updated Objective Findings:      25 Progress Note:  Pain level 3/10  Ankle DF knee bent: 10 deg  Ankle PF knee bent: 90 deg + 30 deg      Treatment   TREATMENT:   THERAPEUTIC ACTIVITY: ( see below for minutes): Therapeutic activities per grid below to improve mobility, strength, balance and coordination. Required minimal visual, verbal, manual and tactile cues to improve independence and safety with daily  Yes

## 2025-03-13 ENCOUNTER — HOSPITAL ENCOUNTER (OUTPATIENT)
Dept: PHYSICAL THERAPY | Age: 25
Setting detail: RECURRING SERIES
Discharge: HOME | End: 2025-03-16
Attending: ORTHOPAEDIC SURGERY
Payer: COMMERCIAL

## 2025-03-13 PROCEDURE — 97110 THERAPEUTIC EXERCISES: CPT

## 2025-03-13 PROCEDURE — 97140 MANUAL THERAPY 1/> REGIONS: CPT

## 2025-03-13 NOTE — PROGRESS NOTES
Mary Valera Shyam  : 2000  Primary: Nik Daley (Worker's Comp)  Secondary:  [facility] [place]  [place] [address]  Phone: [phone]  Fax: [phone] Plan Frequency: 1-2 sessions per week    Plan of Care/Certification Expiration Date: 25        Plan of Care/Certification Expiration Date:  Plan of Care/Certification Expiration Date: 25    Frequency/Duration: Plan Frequency: 1-2 sessions per week      Time In/Out:   Time In: 1230  Time Out: 1316      PT Visit Info:         Visit Count:  17    OUTPATIENT PHYSICAL THERAPY:   Treatment Note 3/13/2025       Episode  (Right ankle Rehabilitation)               Treatment Diagnosis:    Pain in right ankle and joints of right foot  Weakness of right foot  Other muscle spasm  Pain in right leg  Unsteadiness on feet  Medical/Referring Diagnosis:    Right ankle pain, unspecified chronicity  Sprain of calcaneofibular ligament of right ankle, initial encounter      Referring Physician:  Sebastian Live III, MD MD Orders:  PT Eval and Treat   Return MD Appt:  4-6 weeks per physician determination   Date of Onset:  Onset Date: 24     Allergies:   Patient has no known allergies.  Restrictions/Precautions:   None      Interventions Planned (Treatment may consist of any combination of the following):     See Assessment Note    Subjective Comments:   Patient notes that she is having some soreness in the right lateral lower leg.     Initial Pain Level::    3/10  Post Session Pain Level:      3/10  Medications Last Reviewed: 3/13/2025  Updated Objective Findings:      25 Progress Note:  Pain level 3/10  Ankle DF knee bent: 10 deg  Ankle PF knee bent: 90 deg + 30 deg      Treatment   TREATMENT:   THERAPEUTIC ACTIVITY: ( see below for minutes): Therapeutic activities per grid below to improve mobility, strength, balance and coordination. Required minimal visual, verbal, manual and tactile cues to improve

## 2025-03-18 ENCOUNTER — HOSPITAL ENCOUNTER (OUTPATIENT)
Dept: PHYSICAL THERAPY | Age: 25
Setting detail: RECURRING SERIES
Discharge: HOME | End: 2025-03-21
Attending: ORTHOPAEDIC SURGERY
Payer: COMMERCIAL

## 2025-03-18 PROCEDURE — 97110 THERAPEUTIC EXERCISES: CPT

## 2025-03-18 NOTE — PROGRESS NOTES
Mary Valera Shyam  : 2000  Primary: Nik Daley (Worker's Comp)  Secondary:  [facility] @ [place]  [address]  [place] [address]  Phone: [phone]  Fax: [phone] Plan Frequency: 1-2 sessions per week    Plan of Care/Certification Expiration Date: 25        Plan of Care/Certification Expiration Date:  Plan of Care/Certification Expiration Date: 25    Frequency/Duration: Plan Frequency: 1-2 sessions per week      Time In/Out:   Time In: 0245  Time Out: 0333      PT Visit Info:         Visit Count:  18    OUTPATIENT PHYSICAL THERAPY:   Treatment Note 3/18/2025       Episode  (Right ankle Rehabilitation)               Treatment Diagnosis:    Pain in right ankle and joints of right foot  Weakness of right foot  Other muscle spasm  Pain in right leg  Unsteadiness on feet  Medical/Referring Diagnosis:    Right ankle pain, unspecified chronicity  Sprain of calcaneofibular ligament of right ankle, initial encounter      Referring Physician:  Sebastian Live III, MD MD Orders:  PT Eval and Treat   Return MD Appt:  4-6 weeks per physician determination   Date of Onset:  Onset Date: 24     Allergies:   Patient has no known allergies.  Restrictions/Precautions:   None      Interventions Planned (Treatment may consist of any combination of the following):     See Assessment Note    Subjective Comments:   Patient notes she is feeling pretty good.   Initial Pain Level::    3/10  Post Session Pain Level:      3/10  Medications Last Reviewed: 3/18/2025  Updated Objective Findings:      25 Progress Note:  Pain level 3/10  Ankle DF knee bent: 10 deg  Ankle PF knee bent: 90 deg + 30 deg      Treatment   TREATMENT:   THERAPEUTIC ACTIVITY: ( see below for minutes): Therapeutic activities per grid below to improve mobility, strength, balance and coordination. Required minimal visual, verbal, manual and tactile cues to improve independence and safety with daily activities

## 2025-03-20 ENCOUNTER — HOSPITAL ENCOUNTER (OUTPATIENT)
Dept: PHYSICAL THERAPY | Age: 25
Setting detail: RECURRING SERIES
Discharge: HOME | End: 2025-03-23
Attending: ORTHOPAEDIC SURGERY
Payer: COMMERCIAL

## 2025-03-20 PROCEDURE — 97110 THERAPEUTIC EXERCISES: CPT

## 2025-03-20 PROCEDURE — 97140 MANUAL THERAPY 1/> REGIONS: CPT

## 2025-03-20 NOTE — PROGRESS NOTES
Mary Valera Shyam  : 2000  Primary: Nik Daley (Worker's Comp)  Secondary:  [facility] @ [place]  [address]  [place] [address]  Phone: [phone]  Fax: [phone] Plan Frequency: 1-2 sessions per week    Plan of Care/Certification Expiration Date: 25        Plan of Care/Certification Expiration Date:  Plan of Care/Certification Expiration Date: 25    Frequency/Duration: Plan Frequency: 1-2 sessions per week      Time In/Out:   Time In: 0200  Time Out: 0246      PT Visit Info:         Visit Count:  19    OUTPATIENT PHYSICAL THERAPY:   Progress Note/Treatment Note 3/20/2025       Episode  (Right ankle Rehabilitation)               Treatment Diagnosis:    Pain in right ankle and joints of right foot  Weakness of right foot  Other muscle spasm  Pain in right leg  Unsteadiness on feet  Medical/Referring Diagnosis:    Right ankle pain, unspecified chronicity  Sprain of calcaneofibular ligament of right ankle, initial encounter      Referring Physician:  Sebastian Live III, MD MD Orders:  PT Eval and Treat   Return MD Appt:  4-6 weeks per physician determination   Date of Onset:  Onset Date: 24     Allergies:   Patient has no known allergies.  Restrictions/Precautions:   None      Interventions Planned (Treatment may consist of any combination of the following):     See Assessment Note    Subjective Comments:   She notes she has some increased irritation in the right posterior lateral ankle today.     Initial Pain Level::    3/10  Post Session Pain Level:      3/10  Medications Last Reviewed: 3/20/2025  Updated Objective Findings:      25 Progress Note:  Pain level 3/10  Ankle DF knee bent: 10 deg  Ankle PF knee bent: 90 deg + 30 deg    3/20/25 Progress Note:  Pain level 3/10  Ankle DF knee bent: 11 deg  Ankle PF knee bent: 90 + 30 deg  Ambulation: continues to ambulate with trendelenburg, offloads the affected leg, continues to wear brace during

## 2025-03-25 ENCOUNTER — HOSPITAL ENCOUNTER (OUTPATIENT)
Dept: PHYSICAL THERAPY | Age: 25
Setting detail: RECURRING SERIES
Discharge: HOME | End: 2025-03-28
Attending: ORTHOPAEDIC SURGERY
Payer: COMMERCIAL

## 2025-03-25 PROCEDURE — 97110 THERAPEUTIC EXERCISES: CPT

## 2025-03-25 PROCEDURE — 97140 MANUAL THERAPY 1/> REGIONS: CPT

## 2025-03-25 NOTE — PROGRESS NOTES
Mary Valera Shyam  : 2000  Primary: Nik Daley (Worker's Comp)  Secondary:  [facility] @ [place]  [address]  [place] [address]  Phone: [phone]  Fax: [phone] Plan Frequency: 1-2 sessions per week    Plan of Care/Certification Expiration Date: 25        Plan of Care/Certification Expiration Date:  Plan of Care/Certification Expiration Date: 25    Frequency/Duration: Plan Frequency: 1-2 sessions per week      Time In/Out:   Time In: 1230  Time Out: 1318      PT Visit Info:         Visit Count:  20    OUTPATIENT PHYSICAL THERAPY:   Treatment Note 3/25/2025       Episode  (Right ankle Rehabilitation)               Treatment Diagnosis:    Pain in right ankle and joints of right foot  Weakness of right foot  Other muscle spasm  Pain in right leg  Unsteadiness on feet  Medical/Referring Diagnosis:    Right ankle pain, unspecified chronicity  Sprain of calcaneofibular ligament of right ankle, initial encounter      Referring Physician:  Sebastian Live III, MD MD Orders:  PT Eval and Treat   Return MD Appt:  4-6 weeks per physician determination   Date of Onset:  Onset Date: 24     Allergies:   Patient has no known allergies.  Restrictions/Precautions:   None      Interventions Planned (Treatment may consist of any combination of the following):     See Assessment Note    Subjective Comments:   Patient notes that her right leg was irritable over the weekend. She notes she is feeling a bit better today but continues to have irritation in the lateral lower leg and ankle.     Initial Pain Level::    6/10  Post Session Pain Level:      6/10  Medications Last Reviewed: 3/25/2025  Updated Objective Findings:      25 Progress Note:  Pain level 3/10  Ankle DF knee bent: 10 deg  Ankle PF knee bent: 90 deg + 30 deg    3/20/25 Progress Note:  Pain level 3/10  Ankle DF knee bent: 11 deg  Ankle PF knee bent: 90 + 30 deg  Ambulation: continues to ambulate with

## 2025-03-27 ENCOUNTER — HOSPITAL ENCOUNTER (OUTPATIENT)
Dept: PHYSICAL THERAPY | Age: 25
Setting detail: RECURRING SERIES
Discharge: HOME | End: 2025-03-30
Attending: ORTHOPAEDIC SURGERY
Payer: COMMERCIAL

## 2025-03-27 ENCOUNTER — PATIENT MESSAGE (OUTPATIENT)
Dept: ORTHOPEDIC SURGERY | Age: 25
End: 2025-03-27

## 2025-03-27 PROCEDURE — 97110 THERAPEUTIC EXERCISES: CPT

## 2025-03-27 PROCEDURE — 97140 MANUAL THERAPY 1/> REGIONS: CPT

## 2025-03-27 NOTE — PROGRESS NOTES
Mary Valera Shyam  : 2000  Primary: Nik Daley (Worker's Comp)  Secondary:  [facility] @ [place]  [address]  [place] [address]  Phone: [phone]  Fax: [phone] Plan Frequency: 1-2 sessions per week    Plan of Care/Certification Expiration Date: 25        Plan of Care/Certification Expiration Date:  Plan of Care/Certification Expiration Date: 25    Frequency/Duration: Plan Frequency: 1-2 sessions per week      Time In/Out:   Time In: 1230  Time Out: 1318      PT Visit Info:         Visit Count:  21    OUTPATIENT PHYSICAL THERAPY:   Treatment Note 3/27/2025       Episode  (Right ankle Rehabilitation)               Treatment Diagnosis:    Pain in right ankle and joints of right foot  Weakness of right foot  Other muscle spasm  Pain in right leg  Unsteadiness on feet  Medical/Referring Diagnosis:    Right ankle pain, unspecified chronicity  Sprain of calcaneofibular ligament of right ankle, initial encounter      Referring Physician:  Sebastian Live III, MD MD Orders:  PT Eval and Treat   Return MD Appt:  4-6 weeks per physician determination   Date of Onset:  Onset Date: 24     Allergies:   Patient has no known allergies.  Restrictions/Precautions:   None      Interventions Planned (Treatment may consist of any combination of the following):     See Assessment Note    Subjective Comments:   Notes she is feeling better. Still tightness in the right lateral thigh and lower leg.    Initial Pain Level::    6/10  Post Session Pain Level:      6/10  Medications Last Reviewed: 3/27/2025  Updated Objective Findings:      25 Progress Note:  Pain level 3/10  Ankle DF knee bent: 10 deg  Ankle PF knee bent: 90 deg + 30 deg    3/20/25 Progress Note:  Pain level 3/10  Ankle DF knee bent: 11 deg  Ankle PF knee bent: 90 + 30 deg  Ambulation: continues to ambulate with trendelenburg, offloads the affected leg, continues to wear brace during irritable

## 2025-03-27 NOTE — THERAPY RECERTIFICATION
Mary Howe  : 2000  Primary: Nik Daley (Worker's Comp)  Secondary:  [facility] [place]  [address] [facility] [address]  Phone: [phone]  Fax: [phone] Plan Frequency: 1-2 sessions per week      Plan of Care/Certification Expiration Date: 25        Plan of Care/Certification Expiration Date:  Plan of Care/Certification Expiration Date: 25    Frequency/Duration: Plan Frequency: 1-2 sessions per week        Time In/Out:   Time In: 1230  Time Out: 1318      PT Visit Info:         Visit Count:  21                OUTPATIENT PHYSICAL THERAPY:             Therapy Re-assessment and Re-certification 3/27/2025               Episode (Right ankle Rehabilitation)         Treatment Diagnosis:     Pain in right ankle and joints of right foot  Weakness of right foot  Other muscle spasm  Pain in right leg  Unsteadiness on feet  Medical/Referring Diagnosis:    Right ankle pain, unspecified chronicity  Sprain of calcaneofibular ligament of right ankle, initial encounter      Referring Physician:  Sebastian Live III, MD MD Orders:  PT Eval and Treat   Return MD Appt:  4-6 weeks  Date of Onset:  Onset Date: 24     Allergies:  Patient has no known allergies.  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 She was walking through a grass area on campus and stumbled and injured her right ankle on 2024. She has had continues and persistent 8/10 pain. She notes no bruising or swelling post injury but notes that the pain prevents her from ambulating farther than only short walks. She notes that standing is painful. Sitting helps some but does not reduce her pain immediately. She notes sensitivity of the lateral lower leg and lateral foot to light touch.     17

## 2025-03-28 DIAGNOSIS — S93.411A SPRAIN OF CALCANEOFIBULAR LIGAMENT OF RIGHT ANKLE, INITIAL ENCOUNTER: Primary | ICD-10-CM

## 2025-04-01 ENCOUNTER — APPOINTMENT (OUTPATIENT)
Dept: PHYSICAL THERAPY | Age: 25
End: 2025-04-01
Attending: ORTHOPAEDIC SURGERY
Payer: COMMERCIAL

## 2025-04-01 RX ORDER — MELOXICAM 15 MG/1
15 TABLET ORAL DAILY
Qty: 30 TABLET | Refills: 1 | Status: SHIPPED | OUTPATIENT
Start: 2025-04-01

## 2025-04-03 ENCOUNTER — HOSPITAL ENCOUNTER (OUTPATIENT)
Dept: PHYSICAL THERAPY | Age: 25
Setting detail: RECURRING SERIES
Discharge: HOME | End: 2025-04-06
Attending: ORTHOPAEDIC SURGERY
Payer: COMMERCIAL

## 2025-04-03 PROCEDURE — 97110 THERAPEUTIC EXERCISES: CPT

## 2025-04-03 NOTE — PROGRESS NOTES
Heat x 8 min    Heat x 8 min anterior right hip, lower lateral leg in  reclined position    Heat x 8 min in sidelying with boster between knees heat on lateral hip and lateral lower leg Heat x 8 min in sidelying with boster between knees heat on lateral hip and lateral lower leg                    Therapeutic Exercise: 30 min 30 min 25 min 45 min    DKTC x 2 min    Seated toe yoga x 1 min    Seated toe extension x 1 min    Seated DF x 1 min    Seated heel raises x 1 min    SLR x 30 x 2#    Sidelying hip abduction x 3 x 10 x 2#    Supine clams x 10 x 5 sec holds x purple band    Taping** post tib support        DKTC x 30    LTR left x 20    Piriformis stretch 5 sec holds x 5    Figure 4 stretch x 10 sec holds x 5    **taping sciatic nerve offload peroneals DKTC x 30    Piriformis stretch 3 x 10 sec holds    Figure 4 stretch x 5 x 10 sec holds    PPT x 1 min    Sciatic nerve flossing x 30 bilateral seated knee extension only    Cat camel x 10    Arm dog x 5 each     Review of symptoms and testing positioning     Review of posture and use of back support for sitting positions    Review of avoiding sciatic nerve stretching    Review of prone laying for pain relief                                      Proprioceptive Activities:                            Manual Therapy: 8 min 10 min 15 min 5 min    STM gastroc, soleus Left leg hip long axis distraction grade 2-3 Left leg hip long axis distraction grade 2-3    STM right VL, RF    STM light effleurage right tib anterior Light effluerage to the right piriformis, hamstring          Functional Activities:                                       Treatment/Session Summary:    Treatment Assessment:   Patient continues to show significant LE pain. Given her global LE pain with multiple directions I am concerned of neurogenic inflammatory irritation. Patient does show some indication of an extension preference and centralization. She was educated in HEP and modifications to ADLs.

## 2025-04-08 ENCOUNTER — HOSPITAL ENCOUNTER (OUTPATIENT)
Dept: PHYSICAL THERAPY | Age: 25
Setting detail: RECURRING SERIES
Discharge: HOME | End: 2025-04-11
Attending: ORTHOPAEDIC SURGERY
Payer: COMMERCIAL

## 2025-04-08 PROCEDURE — 97140 MANUAL THERAPY 1/> REGIONS: CPT

## 2025-04-08 PROCEDURE — 97110 THERAPEUTIC EXERCISES: CPT

## 2025-04-08 NOTE — PROGRESS NOTES
Mary Valera Shyam  : 2000  Primary: Nik Daley (Worker's Comp)  Secondary:  [facility] @ [place]  [address]  [facility] [address]  Phone: [phone]  Fax: [phone] Plan Frequency: 1-2 sessions per week    Plan of Care/Certification Expiration Date: 25        Plan of Care/Certification Expiration Date:  Plan of Care/Certification Expiration Date: 25    Frequency/Duration: Plan Frequency: 1-2 sessions per week      Time In/Out:   Time In: 1230  Time Out: 1318      PT Visit Info:         Visit Count:  23    OUTPATIENT PHYSICAL THERAPY:   Treatment Note 2025       Episode  (Right ankle Rehabilitation)               Treatment Diagnosis:    Pain in right ankle and joints of right foot  Weakness of right foot  Other muscle spasm  Pain in right leg  Unsteadiness on feet  Medical/Referring Diagnosis:    Right ankle pain, unspecified chronicity  Sprain of calcaneofibular ligament of right ankle, initial encounter      Referring Physician:  Sebastian Live III, MD MD Orders:  PT Eval and Treat   Return MD Appt:  4-6 weeks per physician determination   Date of Onset:  Onset Date: 24     Allergies:   Patient has no known allergies.  Restrictions/Precautions:   None      Interventions Planned (Treatment may consist of any combination of the following):     See Assessment Note    Subjective Comments:   She notes improved pain in the leg. She continues to note soreness in the lower leg and ankle. She notes the prone laying has improved her symptoms as well as ergonomic awareness.     Initial Pain Level::    4/10  Post Session Pain Level:      4/10  Medications Last Reviewed: 2025  Updated Objective Findings:      25 Progress Note:  Pain level 3/10  Ankle DF knee bent: 10 deg  Ankle PF knee bent: 90 deg + 30 deg    3/20/25 Progress Note:  Pain level 3/10  Ankle DF knee bent: 11 deg  Ankle PF knee bent: 90 + 30 deg  Ambulation: continues to ambulate with

## 2025-04-15 ENCOUNTER — TELEPHONE (OUTPATIENT)
Dept: ORTHOPEDIC SURGERY | Age: 25
End: 2025-04-15

## 2025-04-15 ENCOUNTER — HOSPITAL ENCOUNTER (OUTPATIENT)
Dept: PHYSICAL THERAPY | Age: 25
Setting detail: RECURRING SERIES
Discharge: HOME | End: 2025-04-18
Attending: ORTHOPAEDIC SURGERY
Payer: COMMERCIAL

## 2025-04-15 DIAGNOSIS — S93.411A SPRAIN OF CALCANEOFIBULAR LIGAMENT OF RIGHT ANKLE, INITIAL ENCOUNTER: Primary | ICD-10-CM

## 2025-04-15 PROCEDURE — 97140 MANUAL THERAPY 1/> REGIONS: CPT

## 2025-04-15 PROCEDURE — 97110 THERAPEUTIC EXERCISES: CPT

## 2025-04-15 NOTE — TELEPHONE ENCOUNTER
She needs the notes where he was referring her to pain mgmt and if he is approving PT she needs the PT order for additional therapy. She also needs a copy of the referral to pain mgmt. Fax # [phone].

## 2025-04-15 NOTE — PROGRESS NOTES
Heat x 8 min anterior right hip, lower lateral leg in  reclined position    Heat x 8 min in sidelying with boster between knees heat on lateral hip and lateral lower leg Heat x 8 min in sidelying with boster between knees heat on lateral hip and lateral lower leg    Heat x 8 min in sidelying with boster between knees heat on lateral hip and lateral lower leg    Heat x 8 min in sidelying with boster between knees heat on lateral hip and lateral lower leg                        Therapeutic Exercise: 30 min 30 min 25 min 45 min 25 min 15 min    DKTC x 2 min    Seated toe yoga x 1 min    Seated toe extension x 1 min    Seated DF x 1 min    Seated heel raises x 1 min    SLR x 30 x 2#    Sidelying hip abduction x 3 x 10 x 2#    Supine clams x 10 x 5 sec holds x purple band    Taping** post tib support        DKTC x 30    LTR left x 20    Piriformis stretch 5 sec holds x 5    Figure 4 stretch x 10 sec holds x 5    **taping sciatic nerve offload peroneals DKTC x 30    Piriformis stretch 3 x 10 sec holds    Figure 4 stretch x 5 x 10 sec holds    PPT x 1 min    Sciatic nerve flossing x 30 bilateral seated knee extension only    Cat camel x 10    Arm dog x 5 each     Review of symptoms and testing positioning     Review of posture and use of back support for sitting positions    Review of avoiding sciatic nerve stretching    Review of prone laying for pain relief Toe extension x 5    DF x 5    PF x 5    Hooklying TrA x 2 min     Hooklying TrA iso abduction x 2 min    Hooklying TrA iso adduction x 2 min    **Taping lumbar paraspinals**    Review of HEP and POC     TrA in quadraped    Prone press up x 2 x 10    Quadraped TrA arms, prog to legs, prog to full bird dog x 2 x 10    Bridges x 10    Side planks on knees x 4 x 10 seconds    Front planks x 10 sec    Palov press x 10 each side x RTB    LTR x 20                                                Proprioceptive Activities:                                    Manual Therapy:

## 2025-04-17 ENCOUNTER — HOSPITAL ENCOUNTER (OUTPATIENT)
Dept: PHYSICAL THERAPY | Age: 25
Setting detail: RECURRING SERIES
Discharge: HOME | End: 2025-04-20
Attending: ORTHOPAEDIC SURGERY
Payer: COMMERCIAL

## 2025-04-17 PROCEDURE — 97110 THERAPEUTIC EXERCISES: CPT

## 2025-04-17 NOTE — PROGRESS NOTES
Mary Valera Shyam  : 2000  Primary: Nik Daley (Worker's Comp)  Secondary:  [facility] @ [place]  [place] [address]  Phone: [phone]  Fax: [phone] Plan Frequency: 1-2 sessions per week    Plan of Care/Certification Expiration Date: 25        Plan of Care/Certification Expiration Date:  Plan of Care/Certification Expiration Date: 25    Frequency/Duration: Plan Frequency: 1-2 sessions per week      Time In/Out:   Time In: 1230  Time Out: 1313      PT Visit Info:         Visit Count:  25    OUTPATIENT PHYSICAL THERAPY:   Treatment Note 2025       Episode  (Right ankle Rehabilitation)               Treatment Diagnosis:    Pain in right ankle and joints of right foot  Weakness of right foot  Other muscle spasm  Pain in right leg  Unsteadiness on feet  Medical/Referring Diagnosis:    Right ankle pain, unspecified chronicity  Sprain of calcaneofibular ligament of right ankle, initial encounter      Referring Physician:  Sebastian Live III, MD MD Orders:  PT Eval and Treat   Return MD Appt:  4-6 weeks per physician determination   Date of Onset:  Onset Date: 24     Allergies:   Patient has no known allergies.  Restrictions/Precautions:   None      Interventions Planned (Treatment may consist of any combination of the following):     See Assessment Note    Subjective Comments:   Notes some soreness in the posterior lateral lower leg but feeling pretty good today.     Initial Pain Level::    3/10  Post Session Pain Level:      3/10  Medications Last Reviewed: 2025  Updated Objective Findings:      25 Progress Note:  Pain level 3/10  Ankle DF knee bent: 10 deg  Ankle PF knee bent: 90 deg + 30 deg    3/20/25 Progress Note:  Pain level 3/10  Ankle DF knee bent: 11 deg  Ankle PF knee bent: 90 + 30 deg  Ambulation: continues to ambulate with trendelenburg, offloads the affected leg, continues to wear brace during irritable

## 2025-04-22 ENCOUNTER — HOSPITAL ENCOUNTER (OUTPATIENT)
Dept: PHYSICAL THERAPY | Age: 25
Setting detail: RECURRING SERIES
Discharge: HOME | End: 2025-04-25
Attending: ORTHOPAEDIC SURGERY
Payer: COMMERCIAL

## 2025-04-22 PROCEDURE — 97140 MANUAL THERAPY 1/> REGIONS: CPT

## 2025-04-22 PROCEDURE — 97110 THERAPEUTIC EXERCISES: CPT

## 2025-04-22 NOTE — PROGRESS NOTES
Mray Valera Shyam  : 2000  Primary: Nik Daley (Worker's Comp)  Secondary:  [facility] [place]  [address]  [place] [address]  Phone: [phone]  Fax: [phone] Plan Frequency: 1-2 sessions per week    Plan of Care/Certification Expiration Date: 25        Plan of Care/Certification Expiration Date:  Plan of Care/Certification Expiration Date: 25    Frequency/Duration: Plan Frequency: 1-2 sessions per week      Time In/Out:   Time In: 1230  Time Out: 1308      PT Visit Info:         Visit Count:  26    OUTPATIENT PHYSICAL THERAPY:   Progress Note/Treatment Note 2025       Episode  (Right ankle Rehabilitation)               Treatment Diagnosis:    Pain in right ankle and joints of right foot  Weakness of right foot  Other muscle spasm  Pain in right leg  Unsteadiness on feet  Medical/Referring Diagnosis:    Right ankle pain, unspecified chronicity  Sprain of calcaneofibular ligament of right ankle, initial encounter      Referring Physician:  Sebastian Live III, MD MD Orders:  PT Eval and Treat   Return MD Appt:  4-6 weeks per physician determination   Date of Onset:  Onset Date: 24     Allergies:   Patient has no known allergies.  Restrictions/Precautions:   None      Interventions Planned (Treatment may consist of any combination of the following):     See Assessment Note    Subjective Comments:   Patient continues to have soreness in the right lateral hip, lateral leg, and lower lateral leg. She notes that she is feeling better than previously and notes no electric like pain in the leg but just a muscle soreness discomfort. She continues to note lateral posterior ankle pain and posterior knee discomfort as well.     Initial Pain Level::    3/10  Post Session Pain Level:      3/10  Medications Last Reviewed: 2025  Updated Objective Findings:      25 Progress Note:  Pain level 3/10  Ankle DF knee bent: 10 deg  Ankle PF knee bent: 90 deg

## 2025-04-23 ENCOUNTER — OFFICE VISIT (OUTPATIENT)
Dept: ORTHOPEDIC SURGERY | Age: 25
End: 2025-04-23
Payer: COMMERCIAL

## 2025-04-23 DIAGNOSIS — S93.411A SPRAIN OF CALCANEOFIBULAR LIGAMENT OF RIGHT ANKLE, INITIAL ENCOUNTER: Primary | ICD-10-CM

## 2025-04-23 PROCEDURE — 99214 OFFICE O/P EST MOD 30 MIN: CPT | Performed by: ORTHOPAEDIC SURGERY

## 2025-04-23 NOTE — PROGRESS NOTES
Name: Mary Howe  YOB: 2000  Gender: female  MRN: [id number]    Summary:   Right lateral ankle brain with neuritic lower extremity pain       CC: Follow-up (WC right ankle )       HPI: Mary Howe is a 25 y.o. female who presents with Follow-up (WC right ankle )  .  This patient presents back to the office with complaints of neuritic pain which migrates in her leg.  Apparently pain management did try to contact her to schedule an appointment to try to treat her RSD but they have not connected yet.  She states the meloxicam during the day is affecting her mood adversely.    History was obtained by Patient     ROS/Meds/PSH/PMH/FH/SH: I personally reviewed the patients standard intake form.  Below are the pertinents    Tobacco:  reports that she has never smoked. She has never used smokeless tobacco.  Diabetes: None      Physical Examination:    Exam of the right lower extremity shows good range of motion of the ankle.  There is no subluxation of peroneal tendons.  She has good stability.  The skin mottling is gotten better.  She states most of her pain is actually into the buttock region posteriorly rating in the lateral aspect of her right leg now.    Imaging:   No imaging reviewed           ELIEZER MARTINEZ III, MD           Assessment:   Right leg neuritic pain with history of ankle sprain    Treatment Plan:   4 This is a chronic illness/condition with exacerbation and progression  Treatment at this time: Time with no intervention  Studies ordered: NO XR needed @ Next Visit    Weight-bearing status: WBAT        Return to work/work restrictions: none  No medications given    I have counseled her to stop the meloxicam during today as it seems to be adversely affecting her not helping very much.  She will continue with supervised PT.  She does have a pain management appointment hopefully pending now.  We tried to contact and reach out to them to see if they can expedite getting her

## 2025-04-24 ENCOUNTER — HOSPITAL ENCOUNTER (OUTPATIENT)
Dept: PHYSICAL THERAPY | Age: 25
Setting detail: RECURRING SERIES
Discharge: HOME | End: 2025-04-27
Attending: ORTHOPAEDIC SURGERY
Payer: COMMERCIAL

## 2025-04-24 PROCEDURE — 97110 THERAPEUTIC EXERCISES: CPT

## 2025-04-24 NOTE — PROGRESS NOTES
Mary Valera Shyam  : 2000  Primary: Nik Daley (Worker's Comp)  Secondary:  [facility] [place]  [place] [address]  Phone: [phone]  Fax: [phone] Plan Frequency: 1-2 sessions per week    Plan of Care/Certification Expiration Date: 25        Plan of Care/Certification Expiration Date:  Plan of Care/Certification Expiration Date: 25    Frequency/Duration: Plan Frequency: 1-2 sessions per week      Time In/Out:   Time In: 1235  Time Out: 1323      PT Visit Info:         Visit Count:  27    OUTPATIENT PHYSICAL THERAPY:   Treatment Note 2025       Episode  (Right ankle Rehabilitation)               Treatment Diagnosis:    Pain in right ankle and joints of right foot  Weakness of right foot  Other muscle spasm  Pain in right leg  Unsteadiness on feet  Medical/Referring Diagnosis:    Right ankle pain, unspecified chronicity  Sprain of calcaneofibular ligament of right ankle, initial encounter      Referring Physician:  Sebastian Live III, MD MD Orders:  PT Eval and Treat   Return MD Appt:  4-6 weeks per physician determination   Date of Onset:  Onset Date: 24     Allergies:   Patient has no known allergies.  Restrictions/Precautions:   None      Interventions Planned (Treatment may consist of any combination of the following):     See Assessment Note    Subjective Comments:   Continues to be sore. Not as bad as earlier in the week.     Initial Pain Level::    3/10  Post Session Pain Level:      3/10  Medications Last Reviewed: 2025  Updated Objective Findings:      25 Progress Note:  Pain level 3/10  Ankle DF knee bent: 10 deg  Ankle PF knee bent: 90 deg + 30 deg    3/20/25 Progress Note:  Pain level 3/10  Ankle DF knee bent: 11 deg  Ankle PF knee bent: 90 + 30 deg  Ambulation: continues to ambulate with trendelenburg, offloads the affected leg, continues to wear brace during irritable days    4/3/25:  Lumbar flexion: 85 deg

## 2025-04-29 ENCOUNTER — HOSPITAL ENCOUNTER (OUTPATIENT)
Dept: PHYSICAL THERAPY | Age: 25
Setting detail: RECURRING SERIES
Discharge: HOME | End: 2025-05-02
Attending: ORTHOPAEDIC SURGERY
Payer: COMMERCIAL

## 2025-04-29 PROCEDURE — 97110 THERAPEUTIC EXERCISES: CPT

## 2025-04-29 NOTE — PROGRESS NOTES
minutes): Joint mobilization and Soft tissue mobilization was utilized and necessary because of the patient's restricted joint motion, painful spasm, loss of articular motion and restricted motion of soft tissue.   MODALITIES: (see below for minutes): to decrease pain   SELF CARE: (see below for minutes): Procedure(s) (per grid) utilized to improve and/or restore self-care/home management as related to dressing, bathing and grooming. Required minimal verbal cueing to facilitate activities of daily living skills and compensatory activities    Date: 3/20/25  Visit 19  Progress Note 3/25/25  Visit 20 3/27/25  Visit 21  Re-certification 4/3/25  Visit 22 4/8/25  Visit 23 4/15/25  Visit 24 4/17/25  Visit 25 4/22/25  Visit 26  Progress Note 4/24/25  Visit 27 4/29/25  Visit 28     Modalities: 8 min 8 min 8 min 8 min 8 min 8 min 8 min 8 min 8 min 8 min    Heat x 8 min    Heat x 8 min anterior right hip, lower lateral leg in  reclined position    Heat x 8 min in sidelying with boster between knees heat on lateral hip and lateral lower leg Heat x 8 min in sidelying with boster between knees heat on lateral hip and lateral lower leg    Heat x 8 min in sidelying with boster between knees heat on lateral hip and lateral lower leg    Heat x 8 min in sidelying with boster between knees heat on lateral hip and lateral lower leg    Heat x 8 min in sidelying with boster between knees heat on lateral hip and lateral lower leg    Heat x 8 min in sidelying with boster between knees heat on lateral hip and lateral lower leg    Heat x 8 min in sidelying with boster between knees heat on lateral hip and lateral lower leg    Heat x 8 min in sidelying with boster between knees heat on lateral hip and lateral lower leg                                Therapeutic Exercise: 30 min 30 min 25 min 45 min 25 min 15 min 30 min 15 min 38 min 30 min    DKTC x 2 min    Seated toe yoga x 1 min    Seated toe extension x 1 min    Seated DF x 1 min    Seated

## 2025-05-01 ENCOUNTER — HOSPITAL ENCOUNTER (OUTPATIENT)
Dept: PHYSICAL THERAPY | Age: 25
Setting detail: RECURRING SERIES
Discharge: HOME | End: 2025-05-04
Attending: ORTHOPAEDIC SURGERY
Payer: COMMERCIAL

## 2025-05-01 PROCEDURE — 97110 THERAPEUTIC EXERCISES: CPT

## 2025-05-01 NOTE — PROGRESS NOTES
Mary Valera Shyam  : 2000  Primary: Nik Daley (Worker's Comp)  Secondary:  [facility] [place]  [address]  [place] [address]  Phone: [phone]  Fax: [phone] Plan Frequency: 1-2 sessions per week    Plan of Care/Certification Expiration Date: 25        Plan of Care/Certification Expiration Date:  Plan of Care/Certification Expiration Date: 25    Frequency/Duration: Plan Frequency: 1-2 sessions per week      Time In/Out:          PT Visit Info:         Visit Count:  29    OUTPATIENT PHYSICAL THERAPY:   Treatment Note 2025       Episode  (Right ankle Rehabilitation)               Treatment Diagnosis:    Pain in right ankle and joints of right foot  Weakness of right foot  Other muscle spasm  Pain in right leg  Unsteadiness on feet  Medical/Referring Diagnosis:    Right ankle pain, unspecified chronicity  Sprain of calcaneofibular ligament of right ankle, initial encounter      Referring Physician:  Sebastian Live III, MD MD Orders:  PT Eval and Treat   Return MD Appt:  4-6 weeks per physician determination   Date of Onset:  Onset Date: 24     Allergies:   Patient has no known allergies.  Restrictions/Precautions:   None      Interventions Planned (Treatment may consist of any combination of the following):     See Assessment Note    Subjective Comments:   Sore in the ankle and leg today but doing well.     Initial Pain Level::    3/10  Post Session Pain Level:      3/10  Medications Last Reviewed: 2025  Updated Objective Findings:      25 Progress Note:  Pain level 3/10  Ankle DF knee bent: 10 deg  Ankle PF knee bent: 90 deg + 30 deg    3/20/25 Progress Note:  Pain level 3/10  Ankle DF knee bent: 11 deg  Ankle PF knee bent: 90 + 30 deg  Ambulation: continues to ambulate with trendelenburg, offloads the affected leg, continues to wear brace during irritable days    4/3/25:  Lumbar flexion: 85 deg painful posterior hip  Seated lumbar

## 2025-05-07 ENCOUNTER — HOSPITAL ENCOUNTER (OUTPATIENT)
Dept: PHYSICAL THERAPY | Age: 25
Setting detail: RECURRING SERIES
Discharge: HOME | End: 2025-05-10
Attending: ORTHOPAEDIC SURGERY
Payer: COMMERCIAL

## 2025-05-07 PROCEDURE — 97110 THERAPEUTIC EXERCISES: CPT

## 2025-05-07 NOTE — PROGRESS NOTES
Next visit will focus on pain reduction, ankle mobility, pain management.    >Total Treatment Billable Duration:  48 minutes  Time In: 0330  Time Out: 0418    CHASE TESFAYE PT         Charge Capture  Events  [facility] Portal  Appt Desk  Attendance Report     Future Appointments   Date Time Provider Department Center   5/9/2025 12:30 PM Chase Tesfaye, PT [facility]   5/14/2025  4:15 PM Chase Tesfaye, PT [facility]   5/19/2025  8:30 AM Jeremy Mujica MD PM [facility]   5/21/2025  8:45 AM Chase Tesfaye, PT [facility]   5/29/2025 12:30 PM Chase Tesfaye, PT [facility]   6/25/2025 11:00 AM Sebastian Live III, MD [facility]

## 2025-05-09 ENCOUNTER — HOSPITAL ENCOUNTER (OUTPATIENT)
Dept: PHYSICAL THERAPY | Age: 25
Setting detail: RECURRING SERIES
Discharge: HOME | End: 2025-05-12
Attending: ORTHOPAEDIC SURGERY
Payer: COMMERCIAL

## 2025-05-09 PROCEDURE — 97110 THERAPEUTIC EXERCISES: CPT

## 2025-05-09 NOTE — PROGRESS NOTES
Mary Valera Shyam  : 2000  Primary: Nik Daley (Worker's Comp)  Secondary:  [facility] [place]  [place] [address]  Phone: [phone]  Fax: [phone] Plan Frequency: 1-2 sessions per week    Plan of Care/Certification Expiration Date: 25        Plan of Care/Certification Expiration Date:  Plan of Care/Certification Expiration Date: 25    Frequency/Duration: Plan Frequency: 1-2 sessions per week      Time In/Out:   Time In: 1230  Time Out: 1318      PT Visit Info:         Visit Count:  31    OUTPATIENT PHYSICAL THERAPY:   Treatment Note 2025       Episode  (Right ankle Rehabilitation)               Treatment Diagnosis:    Pain in right ankle and joints of right foot  Weakness of right foot  Other muscle spasm  Pain in right leg  Unsteadiness on feet  Medical/Referring Diagnosis:    Right ankle pain, unspecified chronicity  Sprain of calcaneofibular ligament of right ankle, initial encounter      Referring Physician:  Sebastian Live III, MD MD Orders:  PT Eval and Treat   Return MD Appt:  4-6 weeks per physician determination   Date of Onset:  Onset Date: 24     Allergies:   Patient has no known allergies.  Restrictions/Precautions:   None      Interventions Planned (Treatment may consist of any combination of the following):     See Assessment Note    Subjective Comments:   Notes some soreness post last session but minimal persistence.     Initial Pain Level::    3/10  Post Session Pain Level:      3/10  Medications Last Reviewed: 2025  Updated Objective Findings:      25 Progress Note:  Pain level 3/10  Ankle DF knee bent: 10 deg  Ankle PF knee bent: 90 deg + 30 deg    3/20/25 Progress Note:  Pain level 3/10  Ankle DF knee bent: 11 deg  Ankle PF knee bent: 90 + 30 deg  Ambulation: continues to ambulate with trendelenburg, offloads the affected leg, continues to wear brace during irritable days    4/3/25:  Lumbar flexion: 85

## 2025-05-14 ENCOUNTER — HOSPITAL ENCOUNTER (OUTPATIENT)
Dept: PHYSICAL THERAPY | Age: 25
Setting detail: RECURRING SERIES
Discharge: HOME | End: 2025-05-17
Attending: ORTHOPAEDIC SURGERY
Payer: COMMERCIAL

## 2025-05-14 PROCEDURE — 97110 THERAPEUTIC EXERCISES: CPT

## 2025-05-14 PROCEDURE — 97140 MANUAL THERAPY 1/> REGIONS: CPT

## 2025-05-14 NOTE — PROGRESS NOTES
Mary Howe  : 2000  Primary: Nik Daley (Worker's Comp)  Secondary:  [facility] @ [place]  [place] [address]  Phone: [phone]  Fax: [phone] Plan Frequency: 1-2 sessions per week    Plan of Care/Certification Expiration Date: 25        Plan of Care/Certification Expiration Date:  Plan of Care/Certification Expiration Date: 25    Frequency/Duration: Plan Frequency: 1-2 sessions per week      Time In/Out:   Time In: 0415  Time Out: 0508      PT Visit Info:         Visit Count:  32    OUTPATIENT PHYSICAL THERAPY:   Progress Note/Treatment Note 2025       Episode  (Right ankle Rehabilitation)               Treatment Diagnosis:    Pain in right ankle and joints of right foot  Weakness of right foot  Other muscle spasm  Pain in right leg  Unsteadiness on feet  Medical/Referring Diagnosis:    Right ankle pain, unspecified chronicity  Sprain of calcaneofibular ligament of right ankle, initial encounter      Referring Physician:  Sebastian Live III, MD MD Orders:  PT Eval and Treat   Return MD Appt:  4-6 weeks per physician determination   Date of Onset:  Onset Date: 24     Allergies:   Patient has no known allergies.  Restrictions/Precautions:   None      Interventions Planned (Treatment may consist of any combination of the following):     See Assessment Note    Subjective Comments:   Notes was able to walk shopping for 4 hours. She was sore last night post.     Initial Pain Level::    3/10  Post Session Pain Level:      3/10  Medications Last Reviewed: 2025  Updated Objective Findings:      25 Progress Note:  Pain level 3/10  Ankle DF knee bent: 10 deg  Ankle PF knee bent: 90 deg + 30 deg    3/20/25 Progress Note:  Pain level 3/10  Ankle DF knee bent: 11 deg  Ankle PF knee bent: 90 + 30 deg  Ambulation: continues to ambulate with trendelenburg, offloads the affected leg, continues to wear brace during irritable

## 2025-05-16 ENCOUNTER — APPOINTMENT (OUTPATIENT)
Dept: PHYSICAL THERAPY | Age: 25
End: 2025-05-16
Attending: ORTHOPAEDIC SURGERY
Payer: COMMERCIAL

## 2025-05-21 ENCOUNTER — HOSPITAL ENCOUNTER (OUTPATIENT)
Dept: PHYSICAL THERAPY | Age: 25
Setting detail: RECURRING SERIES
Discharge: HOME | End: 2025-05-24
Attending: ORTHOPAEDIC SURGERY
Payer: COMMERCIAL

## 2025-05-21 PROCEDURE — 97140 MANUAL THERAPY 1/> REGIONS: CPT

## 2025-05-21 PROCEDURE — 97110 THERAPEUTIC EXERCISES: CPT

## 2025-05-21 NOTE — PROGRESS NOTES
following fibular mobilization today.     Communication/Consultation:  Spoke with patient in regards to PT POC.  Equipment provided today:  HEP  Recommendations/Intent for next treatment session: Next visit will focus on pain reduction, ankle mobility, pain management.    >Total Treatment Billable Duration:  40 minutes  Time In: 0845  Time Out: 0925    CHASE TESFAYE PT         Charge Capture  Events  [facility] Portal  Appt Desk  Attendance Report     Future Appointments   Date Time Provider Department Center   5/29/2025 12:30 PM Chase Tesfaye, PT [facility]   6/2/2025 10:15 AM Chase Tesfaye, PT [facility]   6/6/2025 11:00 AM Chase Tefsaye, PT [facility]   6/11/2025 10:15 AM Chase Tesfaye, PT [facility]   6/13/2025 11:00 AM Chase Tesfaye, PT [facility]   6/17/2025 10:15 AM Chase Tesfaye, PT [facility]   6/19/2025  8:00 AM Chase Tesfaye, PT [facility]   6/23/2025  9:30 AM Chase Tesfaye, PT [facility]   6/25/2025 11:00 AM Sebastian Live III, MD [facility]   6/26/2025  9:30 AM Chase Tesfaye, PT [facility]   6/30/2025  9:30 AM Jeremy Mujica MD PM [facility]

## 2025-05-27 ENCOUNTER — CLINICAL DOCUMENTATION (OUTPATIENT)
Dept: ORTHOPEDIC SURGERY | Age: 25
End: 2025-05-27

## 2025-05-27 NOTE — PROGRESS NOTES
Note from Pain management    Praveen Hawk Tiffany  Made the 3rd attempt to contact the patient using the phone number listed in chart to schedule appointment with our office today. LMOVM.  Our office will no longer pursue this referral after 3 failed attempts. Please advise the pt to call the office at [phone]  if they would still like to be seen by our office.  Thank you

## 2025-05-29 ENCOUNTER — HOSPITAL ENCOUNTER (OUTPATIENT)
Dept: PHYSICAL THERAPY | Age: 25
Setting detail: RECURRING SERIES
End: 2025-05-29
Attending: ORTHOPAEDIC SURGERY
Payer: COMMERCIAL

## 2025-05-29 PROCEDURE — 97110 THERAPEUTIC EXERCISES: CPT

## 2025-05-29 NOTE — THERAPY RECERTIFICATION
Mary Valera Shyam  : 2000  Primary: Nik Daley (Worker's Comp)  Secondary:  [facility] [place]  [address] [facility] [address]  Phone: [phone]  Fax: [phone] Plan Frequency: 1-2 sessions per week      Plan of Care/Certification Expiration Date: 25        Plan of Care/Certification Expiration Date:  Plan of Care/Certification Expiration Date: 25    Frequency/Duration: Plan Frequency: 1-2 sessions per week        Time In/Out:          PT Visit Info:         Visit Count:  34                OUTPATIENT PHYSICAL THERAPY:             Therapy Re-assessment and Re-certification 2025               Episode (Right ankle Rehabilitation)         Treatment Diagnosis:     Pain in right ankle and joints of right foot  Weakness of right foot  Other muscle spasm  Pain in right leg  Unsteadiness on feet  Medical/Referring Diagnosis:    Right ankle pain, unspecified chronicity  Sprain of calcaneofibular ligament of right ankle, initial encounter      Referring Physician:  Sebastian Live III, MD MD Orders:  PT Eval and Treat   Return MD Appt:  4-6 weeks  Date of Onset:  Onset Date: 24     Allergies:  Patient has no known allergies.  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 She was walking through a grass area on campus and stumbled and injured her right ankle on 2024. She has had continues and persistent 8/10 pain. She notes no bruising or swelling post injury but notes that the pain prevents her from ambulating farther than only short walks. She notes that standing is painful. Sitting helps some but does not reduce her pain immediately. She notes sensitivity of the lateral lower leg and lateral foot to light touch.     2024 - cut through grass

## 2025-05-29 NOTE — PROGRESS NOTES
Mary Valera Shyam  : 2000  Primary: Nik Daley (Worker's Comp)  Secondary:  [facility] [place]  [address]  [place] [address]  Phone: [phone]  Fax: [phone] Plan Frequency: 1-2 sessions per week    Plan of Care/Certification Expiration Date: 25        Plan of Care/Certification Expiration Date:  Plan of Care/Certification Expiration Date: 25    Frequency/Duration: Plan Frequency: 1-2 sessions per week      Time In/Out:   Time In: 1230  Time Out: 1310      PT Visit Info:         Visit Count:  34    OUTPATIENT PHYSICAL THERAPY:   Re-certification/Treatment Note 2025       Episode  (Right ankle Rehabilitation)               Treatment Diagnosis:    Pain in right ankle and joints of right foot  Weakness of right foot  Other muscle spasm  Pain in right leg  Unsteadiness on feet  Medical/Referring Diagnosis:    Right ankle pain, unspecified chronicity  Sprain of calcaneofibular ligament of right ankle, initial encounter      Referring Physician:  Sebastian Live III, MD MD Orders:  PT Eval and Treat   Return MD Appt:  4-6 weeks per physician determination   Date of Onset:  Onset Date: 24     Allergies:   Patient has no known allergies.  Restrictions/Precautions:   None      Interventions Planned (Treatment may consist of any combination of the following):     See Assessment Note    Subjective Comments:   See re-cert    Initial Pain Level::    6/10  Post Session Pain Level:      6/10  Medications Last Reviewed: 2025  Updated Objective Findings:      25 Progress Note:  Pain level 3/10  Ankle DF knee bent: 10 deg  Ankle PF knee bent: 90 deg + 30 deg    3/20/25 Progress Note:  Pain level 3/10  Ankle DF knee bent: 11 deg  Ankle PF knee bent: 90 + 30 deg  Ambulation: continues to ambulate with trendelenburg, offloads the affected leg, continues to wear brace during irritable days    4/3/25:  Lumbar flexion: 85 deg painful posterior hip  Seated

## 2025-06-02 ENCOUNTER — HOSPITAL ENCOUNTER (OUTPATIENT)
Dept: PHYSICAL THERAPY | Age: 25
Setting detail: RECURRING SERIES
Discharge: HOME | End: 2025-06-05
Attending: ORTHOPAEDIC SURGERY
Payer: COMMERCIAL

## 2025-06-02 ENCOUNTER — APPOINTMENT (OUTPATIENT)
Dept: PHYSICAL THERAPY | Age: 25
End: 2025-06-02
Attending: ORTHOPAEDIC SURGERY
Payer: COMMERCIAL

## 2025-06-02 PROCEDURE — 97110 THERAPEUTIC EXERCISES: CPT

## 2025-06-02 NOTE — PROGRESS NOTES
Mary Valera Shyam  : 2000  Primary: Nik Daley (Worker's Comp)  Secondary:  [facility] [place]  [place] [address]  Phone: [phone]  Fax: [phone] Plan Frequency: 1-2 sessions per week    Plan of Care/Certification Expiration Date: 25        Plan of Care/Certification Expiration Date:  Plan of Care/Certification Expiration Date: 25    Frequency/Duration: Plan Frequency: 1-2 sessions per week      Time In/Out:   Time In: 0420  Time Out: 0500      PT Visit Info:         Visit Count:  35    OUTPATIENT PHYSICAL THERAPY:   Treatment Note 2025       Episode  (Right ankle Rehabilitation)               Treatment Diagnosis:    Pain in right ankle and joints of right foot  Weakness of right foot  Other muscle spasm  Pain in right leg  Unsteadiness on feet  Medical/Referring Diagnosis:    Right ankle pain, unspecified chronicity  Sprain of calcaneofibular ligament of right ankle, initial encounter      Referring Physician:  Sebastian Live III, MD MD Orders:  PT Eval and Treat   Return MD Appt:  4-6 weeks per physician determination   Date of Onset:  Onset Date: 24     Allergies:   Patient has no known allergies.  Restrictions/Precautions:   None      Interventions Planned (Treatment may consist of any combination of the following):     See Assessment Note    Subjective Comments:   Had increased pain yesterday evening.     Initial Pain Level::    6/10  Post Session Pain Level:      6/10  Medications Last Reviewed: 2025  Updated Objective Findings:      25 Progress Note:  Pain level 3/10  Ankle DF knee bent: 10 deg  Ankle PF knee bent: 90 deg + 30 deg    3/20/25 Progress Note:  Pain level 3/10  Ankle DF knee bent: 11 deg  Ankle PF knee bent: 90 + 30 deg  Ambulation: continues to ambulate with trendelenburg, offloads the affected leg, continues to wear brace during irritable days    4/3/25:  Lumbar flexion: 85 deg painful posterior

## 2025-06-05 ENCOUNTER — HOSPITAL ENCOUNTER (OUTPATIENT)
Dept: PHYSICAL THERAPY | Age: 25
Setting detail: RECURRING SERIES
Discharge: HOME | End: 2025-06-08
Attending: ORTHOPAEDIC SURGERY
Payer: COMMERCIAL

## 2025-06-05 PROCEDURE — 97110 THERAPEUTIC EXERCISES: CPT

## 2025-06-05 NOTE — PROGRESS NOTES
10 sec holds    PPT x 1 min    Sciatic nerve flossing x 30 bilateral seated knee extension only    Cat camel x 10    Arm dog x 5 each     Review of symptoms and testing positioning     Review of posture and use of back support for sitting positions    Review of avoiding sciatic nerve stretching    Review of prone laying for pain relief Toe extension x 5    DF x 5    PF x 5    Hooklying TrA x 2 min     Hooklying TrA iso abduction x 2 min    Hooklying TrA iso adduction x 2 min    **Taping lumbar paraspinals**    Review of HEP and POC     TrA in quadraped    Prone press up x 2 x 10    Quadraped TrA arms, prog to legs, prog to full bird dog x 2 x 10    Bridges x 10    Side planks on knees x 4 x 10 seconds    Front planks x 10 sec    Palov press x 10 each side x RTB    LTR x 20 Bike x 5 min    Prone press up x 2 x 10    Prone swimmers prog from arms x 10 each, legs x 10 each, cross over legs and arms x 10    TrA x 1 min    TrA x 1 min with adduction iso x 5 sec holds    TrA x 1 min with abduction iso purple band    Bridges x 2 x 10    Sideplanks on knees x 4 x 10 sec     Bike x 5 min    Prone press up x 2 x 10    Prone swimmers x 2 x 10    Bridges x 2 x 10              Bike x 5 min    Prone press up x 2 x 10    Prone swimmers x 2 x 10 each side    Bridges x 3 x 10    Prone hamstring curls x 10 x black band x each leg Bike x 5 min    Prone press up x 2 x 10    Prone swimmers x 2 x 10    Prone hamstring curls x 2 x 10 x black band    Bridges x 2 x 10    Sidelying knee side plank x 2 x 30 sec each side    SL press x 1 x 5 x 40# each side     Bike x 5 min    Prone press up x 10    Prone swimmers x 2 x 10    Front plank 10 sec, 20 sec    Side plank 30 sec    Side plank with leg lift x 10 each leg    Bridges x 10    Prone hamstring curls x 3 x 10 x black band    SL press x 2 x 5 x 40# prog to 60# Bike x 5 min    Prone press up x 2 x 10    Prone swimmers x 2 x 10    Ball bridges x 2 x 10    Prone resist hamstring curls x 3 x 10

## 2025-06-06 ENCOUNTER — APPOINTMENT (OUTPATIENT)
Dept: PHYSICAL THERAPY | Age: 25
End: 2025-06-06
Attending: ORTHOPAEDIC SURGERY
Payer: COMMERCIAL

## 2025-06-11 ENCOUNTER — APPOINTMENT (OUTPATIENT)
Dept: PHYSICAL THERAPY | Age: 25
End: 2025-06-11
Attending: ORTHOPAEDIC SURGERY
Payer: COMMERCIAL

## 2025-06-11 NOTE — PROGRESS NOTES
Previous tests/studies:   Study  Date  Results    ***                                                          Previous therapies:   Type of Therapy  Date  Results    ***                                                          Previous interventions:   Procedure  Date  Results    ***                                                          Previous medication trials:   Class Medication Results   Anti-inflammatory (NSAID)     Neuropathic agent (AED)     Serotonergic agent (antidepressant)     Muscle relaxant     Topical     Acetaminophen     Oral Steroid     Opioid

## 2025-06-12 ENCOUNTER — HOSPITAL ENCOUNTER (OUTPATIENT)
Dept: PHYSICAL THERAPY | Age: 25
Setting detail: RECURRING SERIES
Discharge: HOME | End: 2025-06-15
Attending: ORTHOPAEDIC SURGERY
Payer: COMMERCIAL

## 2025-06-12 PROCEDURE — 97110 THERAPEUTIC EXERCISES: CPT

## 2025-06-12 NOTE — PROGRESS NOTES
Mary Valera Shyam  : 2000  Primary: Nik Daley (Worker's Comp)  Secondary:  [facility] @ [place]  [address]  [place] [address]  Phone: [phone]  Fax: [phone] Plan Frequency: 1-2 sessions per week    Plan of Care/Certification Expiration Date: 25        Plan of Care/Certification Expiration Date:  Plan of Care/Certification Expiration Date: 25    Frequency/Duration: Plan Frequency: 1-2 sessions per week      Time In/Out:   Time In: 0245  Time Out: 331      PT Visit Info:         Visit Count:  37    OUTPATIENT PHYSICAL THERAPY:   Treatment Note 2025       Episode  (Right ankle Rehabilitation)               Treatment Diagnosis:    Pain in right ankle and joints of right foot  Weakness of right foot  Other muscle spasm  Pain in right leg  Unsteadiness on feet  Medical/Referring Diagnosis:    Right ankle pain, unspecified chronicity  Sprain of calcaneofibular ligament of right ankle, initial encounter      Referring Physician:  Sebastian Live III, MD MD Orders:  PT Eval and Treat   Return MD Appt:  4-6 weeks per physician determination   Date of Onset:  Onset Date: 24     Allergies:   Patient has no known allergies.  Restrictions/Precautions:   None      Interventions Planned (Treatment may consist of any combination of the following):     See Assessment Note    Subjective Comments:   Patient notes that she is feeling tightness in her hip when standing. Last night was very difficult with pain throughout the leg.     Initial Pain Level::    6/10  Post Session Pain Level:      6/10  Medications Last Reviewed: 2025  Updated Objective Findings:      25 Progress Note:  Pain level 3/10  Ankle DF knee bent: 10 deg  Ankle PF knee bent: 90 deg + 30 deg    3/20/25 Progress Note:  Pain level 3/10  Ankle DF knee bent: 11 deg  Ankle PF knee bent: 90 + 30 deg  Ambulation: continues to ambulate with trendelenburg, offloads the affected leg, continues

## 2025-06-13 ENCOUNTER — HOSPITAL ENCOUNTER (OUTPATIENT)
Dept: PHYSICAL THERAPY | Age: 25
Setting detail: RECURRING SERIES
Discharge: HOME | End: 2025-06-16
Attending: ORTHOPAEDIC SURGERY
Payer: COMMERCIAL

## 2025-06-13 PROCEDURE — 97140 MANUAL THERAPY 1/> REGIONS: CPT

## 2025-06-13 PROCEDURE — G0283 ELEC STIM OTHER THAN WOUND: HCPCS

## 2025-06-13 PROCEDURE — 97110 THERAPEUTIC EXERCISES: CPT

## 2025-06-13 NOTE — PROGRESS NOTES
Sebastian Live III, MD [facility]   6/27/2025  8:45 AM Chase Tesfaye, PT [facility]   6/30/2025  9:30 AM Jeremy Mujica MD PM [facility] AMB   7/1/2025  4:15 PM Chase Tesfaye, PT [facility]   7/8/2025  4:15 PM Chase Tesfaye, PT [facility]   7/10/2025  4:15 PM Anuradha Cm, PTA [facility]   7/15/2025  4:15 PM Chase Tesfaye, PT [facility]   7/17/2025  4:15 PM Chase Tesfaye, PT [facility]   7/22/2025  4:15 PM Chase Tesfaye, PT [facility]   7/24/2025  4:15 PM Chase Tesfaye, PT [facility]   7/29/2025  4:15 PM Chase Tesfaye, PT [facility]   7/31/2025  4:15 PM Chase Tesfaye, PT [facility]

## 2025-06-17 ENCOUNTER — APPOINTMENT (OUTPATIENT)
Dept: PHYSICAL THERAPY | Age: 25
End: 2025-06-17
Attending: ORTHOPAEDIC SURGERY
Payer: COMMERCIAL

## 2025-06-18 ENCOUNTER — HOSPITAL ENCOUNTER (OUTPATIENT)
Dept: PHYSICAL THERAPY | Age: 25
Setting detail: RECURRING SERIES
Discharge: HOME | End: 2025-06-21
Attending: ORTHOPAEDIC SURGERY
Payer: COMMERCIAL

## 2025-06-18 PROCEDURE — G0283 ELEC STIM OTHER THAN WOUND: HCPCS

## 2025-06-18 PROCEDURE — 97110 THERAPEUTIC EXERCISES: CPT

## 2025-06-18 NOTE — PROGRESS NOTES
Mary Valera Shyam  : 2000  Primary: Nik Daley (Worker's Comp)  Secondary:  [facility] @ [place]  [address]  [place] [address]  Phone: [phone]  Fax: [phone] Plan Frequency: 1-2 sessions per week    Plan of Care/Certification Expiration Date: 25        Plan of Care/Certification Expiration Date:  Plan of Care/Certification Expiration Date: 25    Frequency/Duration: Plan Frequency: 1-2 sessions per week      Time In/Out:   Time In: 1620  Time Out: 1700      PT Visit Info:         Visit Count:  39    OUTPATIENT PHYSICAL THERAPY:   Treatment Note 2025       Episode  (Right ankle Rehabilitation)               Treatment Diagnosis:    Pain in right ankle and joints of right foot  Weakness of right foot  Other muscle spasm  Pain in right leg  Unsteadiness on feet  Medical/Referring Diagnosis:    Right ankle pain, unspecified chronicity  Sprain of calcaneofibular ligament of right ankle, initial encounter      Referring Physician:  Sebastian Live III, MD MD Orders:  PT Eval and Treat   Return MD Appt:  4-6 weeks per physician determination   Date of Onset:  Onset Date: 24     Allergies:   Patient has no known allergies.  Restrictions/Precautions:   None      Interventions Planned (Treatment may consist of any combination of the following):     See Assessment Note    Subjective Comments:   Patient states that she had large amounts of pain last night. She is better today but has pain on the lateral ankle.    Initial Pain Level::    moderate pain  Post Session Pain Level:     no pain  Medications Last Reviewed: 2025  Updated Objective Findings:      25 Progress Note:  Pain level 3/10  Ankle DF knee bent: 10 deg  Ankle PF knee bent: 90 deg + 30 deg    3/20/25 Progress Note:  Pain level 3/10  Ankle DF knee bent: 11 deg  Ankle PF knee bent: 90 + 30 deg  Ambulation: continues to ambulate with trendelenburg, offloads the affected leg, continues to

## 2025-06-19 ENCOUNTER — HOSPITAL ENCOUNTER (OUTPATIENT)
Dept: PHYSICAL THERAPY | Age: 25
Setting detail: RECURRING SERIES
Discharge: HOME | End: 2025-06-22
Attending: ORTHOPAEDIC SURGERY
Payer: COMMERCIAL

## 2025-06-19 PROCEDURE — G0283 ELEC STIM OTHER THAN WOUND: HCPCS

## 2025-06-19 PROCEDURE — 97140 MANUAL THERAPY 1/> REGIONS: CPT

## 2025-06-19 NOTE — PROGRESS NOTES
Mary Valera Shyam  : 2000  Primary: Nik Daley (Worker's Comp)  Secondary:  [facility] @ [place]  [address]  [place] [address]  Phone: [phone]  Fax: [phone] Plan Frequency: 1-2 sessions per week    Plan of Care/Certification Expiration Date: 25        Plan of Care/Certification Expiration Date:  Plan of Care/Certification Expiration Date: 25    Frequency/Duration: Plan Frequency: 1-2 sessions per week      Time In/Out:   Time In: 0800  Time Out: 0845      PT Visit Info:         Visit Count:  40    OUTPATIENT PHYSICAL THERAPY:   Treatment Note 2025       Episode  (Right ankle Rehabilitation)               Treatment Diagnosis:    Pain in right ankle and joints of right foot  Weakness of right foot  Other muscle spasm  Pain in right leg  Unsteadiness on feet  Medical/Referring Diagnosis:    Right ankle pain, unspecified chronicity  Sprain of calcaneofibular ligament of right ankle, initial encounter      Referring Physician:  Sebastian Live III, MD MD Orders:  PT Eval and Treat   Return MD Appt:  4-6 weeks per physician determination   Date of Onset:  Onset Date: 24     Allergies:   Patient has no known allergies.  Restrictions/Precautions:   None      Interventions Planned (Treatment may consist of any combination of the following):     See Assessment Note    Subjective Comments:   Feeling very sore today and had a difficult night of pain last night.     Initial Pain Level::    high levels of pain  Post Session Pain Level:     moderate pain  Medications Last Reviewed: 2025  Updated Objective Findings:      25 Progress Note:  Pain level 3/10  Ankle DF knee bent: 10 deg  Ankle PF knee bent: 90 deg + 30 deg    3/20/25 Progress Note:  Pain level 3/10  Ankle DF knee bent: 11 deg  Ankle PF knee bent: 90 + 30 deg  Ambulation: continues to ambulate with trendelenburg, offloads the affected leg, continues to wear brace during irritable

## 2025-06-23 ENCOUNTER — HOSPITAL ENCOUNTER (OUTPATIENT)
Dept: PHYSICAL THERAPY | Age: 25
Setting detail: RECURRING SERIES
Discharge: HOME | End: 2025-06-26
Attending: ORTHOPAEDIC SURGERY
Payer: COMMERCIAL

## 2025-06-23 PROCEDURE — 97110 THERAPEUTIC EXERCISES: CPT

## 2025-06-23 PROCEDURE — 97140 MANUAL THERAPY 1/> REGIONS: CPT

## 2025-06-23 PROCEDURE — G0283 ELEC STIM OTHER THAN WOUND: HCPCS

## 2025-06-23 NOTE — PROGRESS NOTES
abduction x 2 x 10 x each side  Bike x 5 min    Prone press up x 10    Prone swimmers x 2 x 10 x 1.5# arm and leg    Side knee plank leg abduction x 2 x 10    Ball bridges x 1 x 10     Bike x 5 min    Prone swimmers x 2 x 10 mike    Resisted ham curls prone x 2 x 10 x thick black sport cord    **anterior glide superior head fibula **taping     Bike x 5 min    Prone swimmers x 2 x 10 mike    Bridges x 3 x 10    Step downs x 2 x 10 x 4 inches mike    Heel raises x 3 x 10    Piriformis x 3 x 30 sec Review of symptoms and restrictions at work and how her symptoms are affecting her function    Hamstring curls in prone balck band x 2 x 10 mike    STS prog from 1 x 10 no wt to 2 x 10 x 15#    Piriformis stretch x 3 x 30 sec    Fig 4 3 x 30 sec    Hip flexion stretch 3 x 30 sec    HEP update and education Prone hip ER x 30 prog to RTB     Prone press up x 10    Open books x 10 bilateral    Review of symptoms and education regarding management of pain and use of proper ergonomics at work    **taping offload of the sciatic nerve passing at the biceps femoris insertion and lateral gastroc with leukotape** Bike x 5 min    TrA  1 min    Knee to chest x 3 x 30 sec    TrA + iso hip abduction x 1 min    RDL x 2 x 10    LTR x 1 min         PPT x 1 min    LTR x 1    Bridges x 3 x 10    Revision of HEP given pain    Right hip flexor stretch x 3 x 30 sec LTR x30 B    PPT x15    Bridges 3x10    R hip flexor stretch with therapist assist 1 min hold x 3    SL RDL on the R LE 2x10 with UE support  STS 2 x 10, progressing to 2 x 5 x 20# prog to 35#                                                                                                 Proprioceptive Activities:                                                                        Manual Therapy:      15 min 10 min     15 min  30 min 15 min         STM right glut ERs, right hamstrings, upa right L5 grade 3, STM right multifidus and QL AP superior tibiofibular head mobs grade 3    AP

## 2025-06-24 RX ORDER — PREDNISONE 20 MG/1
40 TABLET ORAL EVERY MORNING
COMMUNITY
Start: 2024-10-24

## 2025-06-24 RX ORDER — DEXTROMETHORPHAN HYDROBROMIDE AND PROMETHAZINE HYDROCHLORIDE 15; 6.25 MG/5ML; MG/5ML
5 SYRUP ORAL
COMMUNITY
Start: 2024-10-24

## 2025-06-25 ENCOUNTER — OFFICE VISIT (OUTPATIENT)
Dept: ORTHOPEDIC SURGERY | Age: 25
End: 2025-06-25
Payer: COMMERCIAL

## 2025-06-25 DIAGNOSIS — S93.411A SPRAIN OF CALCANEOFIBULAR LIGAMENT OF RIGHT ANKLE, INITIAL ENCOUNTER: Primary | ICD-10-CM

## 2025-06-25 PROCEDURE — 99214 OFFICE O/P EST MOD 30 MIN: CPT | Performed by: ORTHOPAEDIC SURGERY

## 2025-06-25 NOTE — PROGRESS NOTES
Name: Mary Howe  YOB: 2000  Gender: female  MRN: [id number]    Summary:     Right lateral ankle sprain with probable reflex sympathetic dystrophy     CC: Follow-up (Right ankle )       HPI: Mary Howe is a 25 y.o. female who presents with Follow-up (Right ankle )  .  This patient returns back to the office today with continued complaints of burning and neuritic type pain in the leg.  She is taking some Elavil now and can tolerate this but is getting some weight on it.  She finally has an appointment to see pain management on Monday.    History was obtained by Patient     ROS/Meds/PSH/PMH/FH/SH: I personally reviewed the patients standard intake form.  Below are the pertinents    Tobacco:  reports that she has never smoked. She has never used smokeless tobacco.  Diabetes: None      Physical Examination:    Exam of the right lower extremity shows some mild swelling over the lateral ankle ligament complex.  She does have global hypersensitivity and skin mottling in this area.    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  I discussed reading the bottle and following the instructions.  I also briefly discussed how NSAIDs can cause GI irritation and Kidney damage. I also discussed Tylenols effect on the Liver.     I am very encouraged that she finally has a pain management appointment.  She states to me she thinks most of the ankle sprain has really resolved and her main problem now is in the neuritic pain which I agree with.  Hopefully they will have lots to offer

## 2025-06-26 ENCOUNTER — APPOINTMENT (OUTPATIENT)
Dept: PHYSICAL THERAPY | Age: 25
End: 2025-06-26
Attending: ORTHOPAEDIC SURGERY
Payer: COMMERCIAL

## 2025-06-27 ENCOUNTER — HOSPITAL ENCOUNTER (OUTPATIENT)
Dept: PHYSICAL THERAPY | Age: 25
Setting detail: RECURRING SERIES
Discharge: HOME | End: 2025-06-30
Attending: ORTHOPAEDIC SURGERY
Payer: COMMERCIAL

## 2025-06-27 PROCEDURE — 97140 MANUAL THERAPY 1/> REGIONS: CPT

## 2025-06-27 PROCEDURE — G0283 ELEC STIM OTHER THAN WOUND: HCPCS

## 2025-06-27 PROCEDURE — 97110 THERAPEUTIC EXERCISES: CPT

## 2025-06-27 NOTE — PROGRESS NOTES
Mary Valera Shyam  : 2000  Primary: Nik Daley (Worker's Comp)  Secondary:  [facility] @ [place]  [address]  [place] [address]  Phone: [phone]  Fax: [phone] Plan Frequency: 1-2 sessions per week    Plan of Care/Certification Expiration Date: 25        Plan of Care/Certification Expiration Date:  Plan of Care/Certification Expiration Date: 25    Frequency/Duration: Plan Frequency: 1-2 sessions per week      Time In/Out:   Time In: 845  Time Out: 925      PT Visit Info:         Visit Count:  42    OUTPATIENT PHYSICAL THERAPY:   Treatment Note 2025       Episode  (Right ankle Rehabilitation)               Treatment Diagnosis:    Pain in right ankle and joints of right foot  Weakness of right foot  Other muscle spasm  Pain in right leg  Unsteadiness on feet  Medical/Referring Diagnosis:    Right ankle pain, unspecified chronicity  Sprain of calcaneofibular ligament of right ankle, initial encounter      Referring Physician:  Sebastian Live III, MD MD Orders:  PT Eval and Treat   Return MD Appt:  4-6 weeks per physician determination   Date of Onset:  Onset Date: 24     Allergies:   Patient has no known allergies.  Restrictions/Precautions:   None      Interventions Planned (Treatment may consist of any combination of the following):     See Assessment Note    Subjective Comments:   Notes that her pain is increased after standing for several hours yesterday.     Initial Pain Level::    6/10  Post Session Pain Level:     2/10  Medications Last Reviewed: 2025  Updated Objective Findings:      25 Progress Note:  Pain level 3/10  Ankle DF knee bent: 10 deg  Ankle PF knee bent: 90 deg + 30 deg    3/20/25 Progress Note:  Pain level 3/10  Ankle DF knee bent: 11 deg  Ankle PF knee bent: 90 + 30 deg  Ambulation: continues to ambulate with trendelenburg, offloads the affected leg, continues to wear brace during irritable days    4/3/25:  Lumbar

## 2025-06-30 ENCOUNTER — OFFICE VISIT (OUTPATIENT)
Age: 25
End: 2025-06-30
Payer: COMMERCIAL

## 2025-06-30 DIAGNOSIS — G89.29 CHRONIC RIGHT-SIDED LOW BACK PAIN WITH RIGHT-SIDED SCIATICA: Primary | ICD-10-CM

## 2025-06-30 DIAGNOSIS — M25.571 CHRONIC PAIN OF RIGHT ANKLE: ICD-10-CM

## 2025-06-30 DIAGNOSIS — G89.29 CHRONIC PAIN OF RIGHT ANKLE: ICD-10-CM

## 2025-06-30 DIAGNOSIS — M54.41 CHRONIC RIGHT-SIDED LOW BACK PAIN WITH RIGHT-SIDED SCIATICA: Primary | ICD-10-CM

## 2025-06-30 PROCEDURE — 99204 OFFICE O/P NEW MOD 45 MIN: CPT | Performed by: ANESTHESIOLOGY

## 2025-06-30 RX ORDER — PREGABALIN 50 MG/1
50 CAPSULE ORAL 2 TIMES DAILY
Qty: 60 CAPSULE | Refills: 1 | Status: SHIPPED | OUTPATIENT
Start: 2025-06-30 | End: 2025-08-29

## 2025-06-30 ASSESSMENT — ENCOUNTER SYMPTOMS
SHORTNESS OF BREATH: 0
ABDOMINAL PAIN: 0

## 2025-06-30 NOTE — PROGRESS NOTES
Chronic Pain Consult Note   Date: June 30, 2025   Patient Name: Mary Howe   MRN: [id number]   PCP: Rebecca Trammell   Referring Provider: Sebastian Live III, MD     Assessment:   Mary Howe is a 25 y.o. female [facility] for chronic pain radiating down her right lower extremity in the setting of prior ankle injury.  Based on history and physical exam, her pain distribution seems most consistent with lumbar radiculopathy with some residual right ankle pain due to her prior injury and some right knee pain due to her abnormal gait due to her ongoing pain issues.  There is been some mention of possible CRPS in the past, however, I do not think her symptoms fit into that diagnosis at this point.  She denies significant allodynia, color change, or swelling in the ankle in the distribution of her symptoms up the entire right lower extremity and more of a dermatomal distribution would not be typical for CRPS.    Diagnosis:   1. Chronic right-sided low back pain with right-sided sciatica    2. Chronic pain of right ankle       Plan:   General Recommendations: The pain condition that the patient suffers from is best treated with a multidisciplinary approach that involves an increase in physical activity to prevent de-conditioning and worsening of the pain cycle, as well as psychological counseling (formal and/or informal) to address the co-morbid psychological effects of pain.  Treatment will often involve judicious use of pain medications and interventional procedures to decrease the pain, allowing the patient to participate in the physical activity that will ultimately produce long-lasting pain reductions.  The goal of the multidisciplinary approach is to return the patient to a higher level of overall function and to restore their ability to perform activities of daily living.     Testing:   Reviewed x-ray right ankle.  Reviewed orthopedic notes.  Reviewed PCP

## 2025-07-01 ENCOUNTER — HOSPITAL ENCOUNTER (OUTPATIENT)
Dept: PHYSICAL THERAPY | Age: 25
Setting detail: RECURRING SERIES
Discharge: HOME | End: 2025-07-04
Attending: ORTHOPAEDIC SURGERY
Payer: COMMERCIAL

## 2025-07-01 PROCEDURE — G0283 ELEC STIM OTHER THAN WOUND: HCPCS

## 2025-07-01 PROCEDURE — 97110 THERAPEUTIC EXERCISES: CPT

## 2025-07-01 NOTE — PROGRESS NOTES
Mary Valera Shyam  : 2000  Primary: Nik Daley (Worker's Comp)  Secondary:  [facility] @ [place]  [place] [address]  Phone: [phone]  Fax: [phone] Plan Frequency: 1-2 sessions per week    Plan of Care/Certification Expiration Date: 25        Plan of Care/Certification Expiration Date:  Plan of Care/Certification Expiration Date: 25    Frequency/Duration: Plan Frequency: 1-2 sessions per week      Time In/Out:   Time In: 0430  Time Out: 0500      PT Visit Info:         Visit Count:  43    OUTPATIENT PHYSICAL THERAPY:   Treatment Note 2025       Episode  (Right ankle Rehabilitation)               Treatment Diagnosis:    Pain in right ankle and joints of right foot  Weakness of right foot  Other muscle spasm  Pain in right leg  Unsteadiness on feet  Medical/Referring Diagnosis:    Right ankle pain, unspecified chronicity  Sprain of calcaneofibular ligament of right ankle, initial encounter      Referring Physician:  Sebastian Live III, MD MD Orders:  PT Eval and Treat   Return MD Appt:  4-6 weeks per physician determination   Date of Onset:  Onset Date: 24     Allergies:   Patient has no known allergies.  Restrictions/Precautions:   None      Interventions Planned (Treatment may consist of any combination of the following):     See Assessment Note    Subjective Comments:   Patient notes she has not noticed a change in her pain since starting the meds from her pain management appointment. Notes she had some difficulty communicating effectively with the pain management physician which she is not thrilled about. She notes they will be ordering an MRI for the lumbar spine.     Initial Pain Level::    6/10  Post Session Pain Level:     6/10  Medications Last Reviewed: 2025  Updated Objective Findings:      25 Progress Note:  Pain level 3/10  Ankle DF knee bent: 10 deg  Ankle PF knee bent: 90 deg + 30 deg    3/20/25 Progress

## 2025-07-08 ENCOUNTER — HOSPITAL ENCOUNTER (OUTPATIENT)
Dept: PHYSICAL THERAPY | Age: 25
Setting detail: RECURRING SERIES
Discharge: HOME | End: 2025-07-11
Attending: ORTHOPAEDIC SURGERY
Payer: COMMERCIAL

## 2025-07-08 PROCEDURE — 97110 THERAPEUTIC EXERCISES: CPT

## 2025-07-08 PROCEDURE — G0283 ELEC STIM OTHER THAN WOUND: HCPCS

## 2025-07-08 NOTE — PROGRESS NOTES
Mary Valera Shyam  : 2000  Primary: Nik Daley (Worker's Comp)  Secondary:  [facility] @ [place]  [address]  [place] [address]  Phone: [phone]  Fax: [phone] Plan Frequency: 1-2 sessions per week    Plan of Care/Certification Expiration Date: 25        Plan of Care/Certification Expiration Date:  Plan of Care/Certification Expiration Date: 25    Frequency/Duration: Plan Frequency: 1-2 sessions per week      Time In/Out:   Time In: 0420  Time Out: 0500      PT Visit Info:         Visit Count:  44    OUTPATIENT PHYSICAL THERAPY:   Treatment Note 2025       Episode  (Right ankle Rehabilitation)               Treatment Diagnosis:    Pain in right ankle and joints of right foot  Weakness of right foot  Other muscle spasm  Pain in right leg  Unsteadiness on feet  Medical/Referring Diagnosis:    Right ankle pain, unspecified chronicity  Sprain of calcaneofibular ligament of right ankle, initial encounter      Referring Physician:  Sebastian Live III, MD MD Orders:  PT Eval and Treat   Return MD Appt:  4-6 weeks per physician determination   Date of Onset:  Onset Date: 24     Allergies:   Patient has no known allergies.  Restrictions/Precautions:   None      Interventions Planned (Treatment may consist of any combination of the following):     See Assessment Note    Subjective Comments:   Notes her ankle is sore but she has had some less pain in the hip and low back.     Initial Pain Level::    6/10  Post Session Pain Level:     6/10  Medications Last Reviewed: 2025  Updated Objective Findings:      25 Progress Note:  Pain level 3/10  Ankle DF knee bent: 10 deg  Ankle PF knee bent: 90 deg + 30 deg    3/20/25 Progress Note:  Pain level 3/10  Ankle DF knee bent: 11 deg  Ankle PF knee bent: 90 + 30 deg  Ambulation: continues to ambulate with trendelenburg, offloads the affected leg, continues to wear brace during irritable

## 2025-07-10 ENCOUNTER — HOSPITAL ENCOUNTER (OUTPATIENT)
Dept: PHYSICAL THERAPY | Age: 25
Setting detail: RECURRING SERIES
Discharge: HOME | End: 2025-07-13
Attending: ORTHOPAEDIC SURGERY
Payer: COMMERCIAL

## 2025-07-10 PROCEDURE — 97110 THERAPEUTIC EXERCISES: CPT

## 2025-07-10 NOTE — PROGRESS NOTES
Mary Valera Shyam  : 2000  Primary: Nik Daley (Worker's Comp)  Secondary:  [facility] @ [place]  [address]  [place] [address]  Phone: [phone]  Fax: [phone] Plan Frequency: 1-2 sessions per week    Plan of Care/Certification Expiration Date: 25        Plan of Care/Certification Expiration Date:  Plan of Care/Certification Expiration Date: 25    Frequency/Duration: Plan Frequency: 1-2 sessions per week      Time In/Out:   Time In: 1625  Time Out: 1700      PT Visit Info:         Visit Count:  45    OUTPATIENT PHYSICAL THERAPY:   Treatment Note 7/10/2025       Episode  (Right ankle Rehabilitation)               Treatment Diagnosis:    Pain in right ankle and joints of right foot  Weakness of right foot  Other muscle spasm  Pain in right leg  Unsteadiness on feet  Medical/Referring Diagnosis:    Right ankle pain, unspecified chronicity  Sprain of calcaneofibular ligament of right ankle, initial encounter      Referring Physician:  Sebastian Live III, MD MD Orders:  PT Eval and Treat   Return MD Appt:  4-6 weeks per physician determination   Date of Onset:  Onset Date: 24     Allergies:   Patient has no known allergies.  Restrictions/Precautions:   None      Interventions Planned (Treatment may consist of any combination of the following):     See Assessment Note    Subjective Comments:   Pt reports right hip, knee and ankle pain. Pt is concerned with the knee pain and crepitus. Pt has a MRI tomorrow.    Initial Pain Level::    610  Post Session Pain Level:     Pt reported increased burning with weight bearing activities.  Medications Last Reviewed: 7/10/2025  Updated Objective Findings:      25 Progress Note:  Pain level 3/10  Ankle DF knee bent: 10 deg  Ankle PF knee bent: 90 deg + 30 deg    3/20/25 Progress Note:  Pain level 3/10  Ankle DF knee bent: 11 deg  Ankle PF knee bent: 90 + 30 deg  Ambulation: continues to ambulate with

## 2025-07-15 ENCOUNTER — HOSPITAL ENCOUNTER (OUTPATIENT)
Dept: PHYSICAL THERAPY | Age: 25
Setting detail: RECURRING SERIES
Discharge: HOME | End: 2025-07-18
Attending: ORTHOPAEDIC SURGERY
Payer: COMMERCIAL

## 2025-07-15 PROCEDURE — 97140 MANUAL THERAPY 1/> REGIONS: CPT

## 2025-07-15 PROCEDURE — 97110 THERAPEUTIC EXERCISES: CPT

## 2025-07-15 NOTE — PROGRESS NOTES
Mary Valera Shyam  : 2000  Primary: Nik Daley (Worker's Comp)  Secondary:  [facility] @ [place]  [address]  [place] [address]  Phone: [phone]  Fax: [phone] Plan Frequency: 1-2 sessions per week    Plan of Care/Certification Expiration Date: 10/13/25        Plan of Care/Certification Expiration Date:  Plan of Care/Certification Expiration Date: 10/13/25    Frequency/Duration: Plan Frequency: 1-2 sessions per week      Time In/Out:   Time In: 415  Time Out: 455      PT Visit Info:         Visit Count:  46    OUTPATIENT PHYSICAL THERAPY:   Treatment Note 7/15/2025       Episode  (Right ankle Rehabilitation)               Treatment Diagnosis:    Pain in right ankle and joints of right foot  Weakness of right foot  Other muscle spasm  Pain in right leg  Unsteadiness on feet  Medical/Referring Diagnosis:    Right ankle pain, unspecified chronicity  Sprain of calcaneofibular ligament of right ankle, initial encounter      Referring Physician:  Sebastian Live III, MD MD Orders:  PT Eval and Treat   Return MD Appt:  4-6 weeks per physician determination   Date of Onset:  Onset Date: 24     Allergies:   Patient has no known allergies.  Restrictions/Precautions:   None      Interventions Planned (Treatment may consist of any combination of the following):     See Assessment Note    Subjective Comments:   See re-certification    Initial Pain Level::   10/10  Post Session Pain Level:     8.5/10   Medications Last Reviewed: 7/15/2025  Updated Objective Findings:      25 Progress Note:  Pain level 3/10  Ankle DF knee bent: 10 deg  Ankle PF knee bent: 90 deg + 30 deg    3/20/25 Progress Note:  Pain level 3/10  Ankle DF knee bent: 11 deg  Ankle PF knee bent: 90 + 30 deg  Ambulation: continues to ambulate with trendelenburg, offloads the affected leg, continues to wear brace during irritable days    4/3/25:  Lumbar flexion: 85 deg painful posterior hip  Seated lumbar

## 2025-07-15 NOTE — THERAPY RECERTIFICATION
08/11/25     Frequency/Duration: Plan Frequency: 1-2 sessions per week        Interventions Planned (Treatment may consist of any combination of the following):    Balance Training, Bed Mobility, Endurance Training, Functional Mobility Training, Gait Training, Home Exercise Program (HEP), Manual Therapy, Neuromuscular Re-education/Strengthening, Pain Management, Positioning, Range of Motion (ROM), Return to Work-Related Activiity, Stair Training, Therapeutic Activites, Therapeutic Exercise/Strengthening, Transfer Training, Equipment Evaluation, Education, and Procurement, Patient/Caregiver Education & Training, Safety Education and Training, ADL/Self-Care Training, IADL Training, Aquatic Therapy, and Dry Needling   Goals: (Goals have been discussed and agreed upon with patient.)  Short-Term Functional Goals: Time Frame: 4 wks  Patient will perform 90 + 10 degrees of active dorsiflexion on the right. MET  Patient will perform SLS on the right. IMET  Patient will demonstrate step up 8 inch step. NOT MET  Patient will ambulate with less than 6/10 pain. NOT MET  Discharge Goals: Time Frame: 8 wks  Patient will ambulate with 0/10 pain. NOT MET  Patient will perform transfers with 0/10 pain. NOT MET  Patient will ascend and descend stairs with 0/10 pain. NOT MET         Medical Necessity:   > Patient is expected to demonstrate progress in strength, range of motion, balance, coordination, and functional technique to improve pain, mobility, and stability to return to ADLs and ambulation safely and with less discomfort.  Reason For Services/Other Comments:  Patient will benefit from skilled therapy to improve pain, mobility, stability, strength, motor control and functional capacity in order for the patient to return to transfers, ambulating, ascending and descending stairs and pain control to allow her to return to work and ADLs without difficulty.        Regarding Mary Howe's therapy, I certify that the

## 2025-07-17 ENCOUNTER — HOSPITAL ENCOUNTER (OUTPATIENT)
Dept: PHYSICAL THERAPY | Age: 25
Setting detail: RECURRING SERIES
Discharge: HOME | End: 2025-07-20
Attending: ORTHOPAEDIC SURGERY
Payer: COMMERCIAL

## 2025-07-17 PROCEDURE — 97140 MANUAL THERAPY 1/> REGIONS: CPT

## 2025-07-17 NOTE — PROGRESS NOTES
Mary Valera Shyam  : 2000  Primary: Nik Daley (Worker's Comp)  Secondary:  [facility] [place]  [address]  [place] [address]  Phone: [phone]  Fax: [phone] Plan Frequency: 1-2 sessions per week    Plan of Care/Certification Expiration Date: 25        Plan of Care/Certification Expiration Date:  Plan of Care/Certification Expiration Date: 25    Frequency/Duration: Plan Frequency: 1-2 sessions per week      Time In/Out:   Time In: 415  Time Out: 445      PT Visit Info:         Visit Count:  47    OUTPATIENT PHYSICAL THERAPY:   Treatment Note 2025       Episode  (Right ankle Rehabilitation)               Treatment Diagnosis:    Pain in right ankle and joints of right foot  Weakness of right foot  Other muscle spasm  Pain in right leg  Unsteadiness on feet  Medical/Referring Diagnosis:    Right ankle pain, unspecified chronicity  Sprain of calcaneofibular ligament of right ankle, initial encounter      Referring Physician:  Sebastian Live III, MD MD Orders:  PT Eval and Treat   Return MD Appt:  4-6 weeks per physician determination   Date of Onset:  Onset Date: 24     Allergies:   Patient has no known allergies.  Restrictions/Precautions:   None      Interventions Planned (Treatment may consist of any combination of the following):     See Assessment Note    Subjective Comments:   She arrives with 8.5/10 pain and notes improvement in her pain following last session. HEP is helping.     Initial Pain Level::   8.5/10  Post Session Pain Level:     8/10   Medications Last Reviewed: 2025  Updated Objective Findings:      25 Progress Note:  Pain level 3/10  Ankle DF knee bent: 10 deg  Ankle PF knee bent: 90 deg + 30 deg    3/20/25 Progress Note:  Pain level 3/10  Ankle DF knee bent: 11 deg  Ankle PF knee bent: 90 + 30 deg  Ambulation: continues to ambulate with trendelenburg, offloads the affected leg, continues to wear brace during

## 2025-07-22 ENCOUNTER — HOSPITAL ENCOUNTER (OUTPATIENT)
Dept: PHYSICAL THERAPY | Age: 25
Setting detail: RECURRING SERIES
Discharge: HOME | End: 2025-07-25
Attending: ORTHOPAEDIC SURGERY
Payer: COMMERCIAL

## 2025-07-22 PROCEDURE — 97140 MANUAL THERAPY 1/> REGIONS: CPT

## 2025-07-22 NOTE — PROGRESS NOTES
hold x 3    SL RDL on the R LE 2x10 with UE support  STS 2 x 10, progressing to 2 x 5 x 20# prog to 35#     Review of HEP and progression    Review of press ups and ergonomics for hep Resisted eversion and inversion x 2 x 15 x RTB    Heel raises x 2 x 10    STS x 2 x 10    Gastroc stretch x 2 x 30 sec mike Nustep x lvl 5 x 5 min (better than bike with less pain)    Resisted eversion and inversion x 2 x 15 x BTB    Resisted DF and PF and eversion and infersion x 2 x 15 x BTB      Toe yoga x 1 min    Heel raises x 2 x 10    Gastroc stretch x 2 x 30 sec             Seated ankle pumps x30    Seated ankle circles x30 ea direction    Stepper bike x 5 min no resistance    Sharath step over x 3 laps leading with R LE and x3 laps leading with L LE Education in modalities and positions and sleeping positions to help with easing her symptoms and allow for more recovery    Edu in supine hooklying position and prone laying with right knee flexion for offloading the nervous system for pain relief               Prone press ups x 2 x 10                                                                                     Proprioceptive Activities:                                                                            Manual Therapy: 10 min     15 min  30 min 15 min 15 min    30 min 30 min 30 min    AP superior tibiofibular head mobs grade 3    AP inforior tibiofibular mobs grade 3     STM right glut med, min, piriformis, hamstring    Sciatic offload slider grade 1-2     Ankle DF x 30 grade 3  STM right glut med, min, piriformis, hamstring, IASTM ITB/VL/RF    STM tib anterior and peroneals STM right glut med, min, piriformis, hamstring, IASTM ITB/VL/RF    UPA right C4,5 grade 3    STM right lumbar paraspinals and right glut med, piriformis, glut min    STM right glut med, min, piriformis    STM hamstrings    Grade 1-2 neural offloading sliders STM right glut med, min, piriformis    STM hamstrings    Grade 1-2 neural offloading

## 2025-07-23 DIAGNOSIS — G89.29 CHRONIC PAIN OF RIGHT ANKLE: Primary | ICD-10-CM

## 2025-07-23 DIAGNOSIS — M25.571 CHRONIC PAIN OF RIGHT ANKLE: Primary | ICD-10-CM

## 2025-07-24 ENCOUNTER — HOSPITAL ENCOUNTER (OUTPATIENT)
Dept: PHYSICAL THERAPY | Age: 25
Setting detail: RECURRING SERIES
Discharge: HOME | End: 2025-07-27
Attending: ORTHOPAEDIC SURGERY
Payer: COMMERCIAL

## 2025-07-24 PROCEDURE — 97110 THERAPEUTIC EXERCISES: CPT

## 2025-07-24 NOTE — PROGRESS NOTES
B    PPT x15    Bridges 3x10    R hip flexor stretch with therapist assist 1 min hold x 3    SL RDL on the R LE 2x10 with UE support  STS 2 x 10, progressing to 2 x 5 x 20# prog to 35#     Review of HEP and progression    Review of press ups and ergonomics for hep Resisted eversion and inversion x 2 x 15 x RTB    Heel raises x 2 x 10    STS x 2 x 10    Gastroc stretch x 2 x 30 sec mike Nustep x lvl 5 x 5 min (better than bike with less pain)    Resisted eversion and inversion x 2 x 15 x BTB    Resisted DF and PF and eversion and infersion x 2 x 15 x BTB      Toe yoga x 1 min    Heel raises x 2 x 10    Gastroc stretch x 2 x 30 sec             Seated ankle pumps x30    Seated ankle circles x30 ea direction    Stepper bike x 5 min no resistance    Sharath step over x 3 laps leading with R LE and x3 laps leading with L LE Education in modalities and positions and sleeping positions to help with easing her symptoms and allow for more recovery    Edu in supine hooklying position and prone laying with right knee flexion for offloading the nervous system for pain relief                Prone press ups x 2 x 10                                                                                          Proprioceptive Activities:                                                                                Manual Therapy: 10 min     15 min  30 min 15 min 15 min    30 min 30 min 30 min 30 min    AP superior tibiofibular head mobs grade 3    AP inforior tibiofibular mobs grade 3     STM right glut med, min, piriformis, hamstring    Sciatic offload slider grade 1-2     Ankle DF x 30 grade 3  STM right glut med, min, piriformis, hamstring, IASTM ITB/VL/RF    STM tib anterior and peroneals STM right glut med, min, piriformis, hamstring, IASTM ITB/VL/RF    UPA right C4,5 grade 3    STM right lumbar paraspinals and right glut med, piriformis, glut min    STM right glut med, min, piriformis    STM hamstrings    Grade 1-2 neural offloading

## 2025-07-29 ENCOUNTER — HOSPITAL ENCOUNTER (OUTPATIENT)
Dept: PHYSICAL THERAPY | Age: 25
Setting detail: RECURRING SERIES
Discharge: HOME | End: 2025-08-01
Attending: ORTHOPAEDIC SURGERY
Payer: COMMERCIAL

## 2025-07-29 PROCEDURE — 97140 MANUAL THERAPY 1/> REGIONS: CPT

## 2025-07-29 NOTE — PROGRESS NOTES
Mary Valera Shyam  : 2000  Primary: Nik Daley (Worker's Comp)  Secondary:  [facility] [place]  [place] [address]  Phone: [phone]  Fax: [phone] Plan Frequency: 1-2 sessions per week    Plan of Care/Certification Expiration Date: 25        Plan of Care/Certification Expiration Date:  Plan of Care/Certification Expiration Date: 25    Frequency/Duration: Plan Frequency: 1-2 sessions per week      Time In/Out:   Time In: 415  Time Out: 445      PT Visit Info:         Visit Count:  50    OUTPATIENT PHYSICAL THERAPY:   Treatment Note 2025       Episode  (Right ankle Rehabilitation)               Treatment Diagnosis:    Pain in right ankle and joints of right foot  Weakness of right foot  Other muscle spasm  Pain in right leg  Unsteadiness on feet  Medical/Referring Diagnosis:    Right ankle pain, unspecified chronicity  Sprain of calcaneofibular ligament of right ankle, initial encounter      Referring Physician:  Sebastian Live III, MD MD Orders:  PT Eval and Treat   Return MD Appt:  4-6 weeks per physician determination   Date of Onset:  Onset Date: 24     Allergies:   Patient has no known allergies.  Restrictions/Precautions:   None      Interventions Planned (Treatment may consist of any combination of the following):     See Assessment Note    Subjective Comments:   Sore this weekend but was able to ambulate further and able to do some house chores.     Initial Pain Level::   5.5/10  Post Session Pain Level:     5/10   Medications Last Reviewed: 2025  Updated Objective Findings:      25 Progress Note:  Pain level 3/10  Ankle DF knee bent: 10 deg  Ankle PF knee bent: 90 deg + 30 deg    3/20/25 Progress Note:  Pain level 3/10  Ankle DF knee bent: 11 deg  Ankle PF knee bent: 90 + 30 deg  Ambulation: continues to ambulate with trendelenburg, offloads the affected leg, continues to wear brace during irritable

## 2025-07-31 ENCOUNTER — APPOINTMENT (OUTPATIENT)
Dept: PHYSICAL THERAPY | Age: 25
End: 2025-07-31
Attending: ORTHOPAEDIC SURGERY
Payer: COMMERCIAL

## 2025-08-05 ENCOUNTER — HOSPITAL ENCOUNTER (OUTPATIENT)
Dept: PHYSICAL THERAPY | Age: 25
Setting detail: RECURRING SERIES
Discharge: HOME | End: 2025-08-08
Attending: ORTHOPAEDIC SURGERY
Payer: COMMERCIAL

## 2025-08-05 PROCEDURE — 97110 THERAPEUTIC EXERCISES: CPT

## 2025-08-05 PROCEDURE — 97140 MANUAL THERAPY 1/> REGIONS: CPT

## 2025-08-08 ENCOUNTER — OFFICE VISIT (OUTPATIENT)
Age: 25
End: 2025-08-08
Payer: COMMERCIAL

## 2025-08-08 DIAGNOSIS — M54.41 CHRONIC RIGHT-SIDED LOW BACK PAIN WITH RIGHT-SIDED SCIATICA: Primary | ICD-10-CM

## 2025-08-08 DIAGNOSIS — G89.29 CHRONIC PAIN OF RIGHT ANKLE: ICD-10-CM

## 2025-08-08 DIAGNOSIS — G89.29 CHRONIC RIGHT-SIDED LOW BACK PAIN WITH RIGHT-SIDED SCIATICA: Primary | ICD-10-CM

## 2025-08-08 DIAGNOSIS — M25.571 CHRONIC PAIN OF RIGHT ANKLE: ICD-10-CM

## 2025-08-08 PROCEDURE — 99214 OFFICE O/P EST MOD 30 MIN: CPT | Performed by: ANESTHESIOLOGY

## 2025-08-08 RX ORDER — PREGABALIN 100 MG/1
100 CAPSULE ORAL 2 TIMES DAILY
Qty: 60 CAPSULE | Refills: 1 | Status: SHIPPED | OUTPATIENT
Start: 2025-08-08 | End: 2025-10-07

## 2025-08-08 RX ORDER — METHOCARBAMOL 500 MG/1
500 TABLET, FILM COATED ORAL 2 TIMES DAILY PRN
Qty: 60 TABLET | Refills: 1 | Status: SHIPPED | OUTPATIENT
Start: 2025-08-08 | End: 2025-10-07

## 2025-08-08 ASSESSMENT — ENCOUNTER SYMPTOMS
ABDOMINAL PAIN: 0
SHORTNESS OF BREATH: 0

## 2025-08-12 ENCOUNTER — HOSPITAL ENCOUNTER (OUTPATIENT)
Dept: PHYSICAL THERAPY | Age: 25
Setting detail: RECURRING SERIES
Discharge: HOME | End: 2025-08-15
Attending: ORTHOPAEDIC SURGERY
Payer: COMMERCIAL

## 2025-08-12 PROCEDURE — 97140 MANUAL THERAPY 1/> REGIONS: CPT

## 2025-08-12 PROCEDURE — 97110 THERAPEUTIC EXERCISES: CPT

## 2025-08-14 ENCOUNTER — HOSPITAL ENCOUNTER (OUTPATIENT)
Dept: PHYSICAL THERAPY | Age: 25
Setting detail: RECURRING SERIES
Discharge: HOME | End: 2025-08-17
Attending: ORTHOPAEDIC SURGERY
Payer: COMMERCIAL

## 2025-08-14 PROCEDURE — 97110 THERAPEUTIC EXERCISES: CPT

## 2025-08-14 PROCEDURE — 97140 MANUAL THERAPY 1/> REGIONS: CPT

## 2025-08-17 DIAGNOSIS — F41.9 ANXIETY: ICD-10-CM

## 2025-08-18 ENCOUNTER — CLINICAL DOCUMENTATION (OUTPATIENT)
Dept: ORTHOPEDIC SURGERY | Age: 25
End: 2025-08-18

## 2025-08-18 RX ORDER — SERTRALINE HYDROCHLORIDE 25 MG/1
25 TABLET, FILM COATED ORAL DAILY
Qty: 90 TABLET | Refills: 1 | Status: SHIPPED | OUTPATIENT
Start: 2025-08-18

## 2025-08-19 ENCOUNTER — HOSPITAL ENCOUNTER (OUTPATIENT)
Dept: PHYSICAL THERAPY | Age: 25
Setting detail: RECURRING SERIES
Discharge: HOME | End: 2025-08-22
Attending: ORTHOPAEDIC SURGERY
Payer: COMMERCIAL

## 2025-08-19 PROCEDURE — 97140 MANUAL THERAPY 1/> REGIONS: CPT

## 2025-08-19 PROCEDURE — 97110 THERAPEUTIC EXERCISES: CPT

## 2025-08-21 ENCOUNTER — APPOINTMENT (OUTPATIENT)
Dept: PHYSICAL THERAPY | Age: 25
End: 2025-08-21
Attending: ORTHOPAEDIC SURGERY
Payer: COMMERCIAL

## 2025-08-26 ENCOUNTER — APPOINTMENT (OUTPATIENT)
Dept: PHYSICAL THERAPY | Age: 25
End: 2025-08-26
Attending: ORTHOPAEDIC SURGERY
Payer: COMMERCIAL

## 2025-08-28 ENCOUNTER — APPOINTMENT (OUTPATIENT)
Dept: PHYSICAL THERAPY | Age: 25
End: 2025-08-28
Attending: ORTHOPAEDIC SURGERY
Payer: COMMERCIAL